# Patient Record
Sex: FEMALE | Race: WHITE | NOT HISPANIC OR LATINO | Employment: FULL TIME | URBAN - METROPOLITAN AREA
[De-identification: names, ages, dates, MRNs, and addresses within clinical notes are randomized per-mention and may not be internally consistent; named-entity substitution may affect disease eponyms.]

---

## 2017-04-10 ENCOUNTER — OFFICE VISIT - HEALTHEAST (OUTPATIENT)
Dept: FAMILY MEDICINE | Facility: CLINIC | Age: 27
End: 2017-04-10

## 2017-04-10 DIAGNOSIS — B86 SCABIES: ICD-10-CM

## 2017-07-03 ENCOUNTER — AMBULATORY - HEALTHEAST (OUTPATIENT)
Dept: ENDOCRINOLOGY | Facility: CLINIC | Age: 27
End: 2017-07-03

## 2017-07-03 DIAGNOSIS — Z00.00 PERIODIC HEALTH ASSESSMENT, GENERAL SCREENING, ADULT: ICD-10-CM

## 2017-07-03 DIAGNOSIS — E03.9 HYPOTHYROIDISM: ICD-10-CM

## 2017-07-05 ENCOUNTER — AMBULATORY - HEALTHEAST (OUTPATIENT)
Dept: ENDOCRINOLOGY | Facility: CLINIC | Age: 27
End: 2017-07-05

## 2017-07-05 ENCOUNTER — AMBULATORY - HEALTHEAST (OUTPATIENT)
Dept: LAB | Facility: CLINIC | Age: 27
End: 2017-07-05

## 2017-07-05 ENCOUNTER — COMMUNICATION - HEALTHEAST (OUTPATIENT)
Dept: LAB | Facility: CLINIC | Age: 27
End: 2017-07-05

## 2017-07-05 DIAGNOSIS — Z00.00 PERIODIC HEALTH ASSESSMENT, GENERAL SCREENING, ADULT: ICD-10-CM

## 2017-07-05 DIAGNOSIS — E03.9 HYPOTHYROIDISM: ICD-10-CM

## 2017-07-05 LAB
CHOLEST SERPL-MCNC: 167 MG/DL
FASTING STATUS PATIENT QL REPORTED: YES
HDLC SERPL-MCNC: 60 MG/DL
LDLC SERPL CALC-MCNC: 93 MG/DL
TRIGL SERPL-MCNC: 68 MG/DL

## 2017-07-13 ENCOUNTER — AMBULATORY - HEALTHEAST (OUTPATIENT)
Dept: ENDOCRINOLOGY | Facility: CLINIC | Age: 27
End: 2017-07-13

## 2017-07-13 ENCOUNTER — COMMUNICATION - HEALTHEAST (OUTPATIENT)
Dept: ENDOCRINOLOGY | Facility: CLINIC | Age: 27
End: 2017-07-13

## 2017-07-13 DIAGNOSIS — M54.9 BACK PAIN: ICD-10-CM

## 2017-07-19 ENCOUNTER — COMMUNICATION - HEALTHEAST (OUTPATIENT)
Dept: PHYSICAL MEDICINE AND REHAB | Facility: CLINIC | Age: 27
End: 2017-07-19

## 2017-07-19 ENCOUNTER — HOSPITAL ENCOUNTER (OUTPATIENT)
Dept: PHYSICAL MEDICINE AND REHAB | Facility: CLINIC | Age: 27
Discharge: HOME | End: 2017-07-19
Attending: PHYSICIAN ASSISTANT

## 2017-07-19 DIAGNOSIS — M79.18 MYOFASCIAL PAIN: ICD-10-CM

## 2017-07-19 DIAGNOSIS — G47.9 SLEEP DISTURBANCE: ICD-10-CM

## 2017-07-19 DIAGNOSIS — M54.50 LUMBALGIA: ICD-10-CM

## 2017-07-19 DIAGNOSIS — M53.3 SACROILIAC DYSFUNCTION: ICD-10-CM

## 2017-07-19 DIAGNOSIS — M54.16 LUMBAR RADICULITIS: ICD-10-CM

## 2017-07-19 ASSESSMENT — MIFFLIN-ST. JEOR: SCORE: 1361.57

## 2017-07-26 ENCOUNTER — HOSPITAL ENCOUNTER (OUTPATIENT)
Dept: MRI IMAGING | Facility: HOSPITAL | Age: 27
Discharge: HOME | End: 2017-07-26
Attending: PHYSICIAN ASSISTANT

## 2017-07-26 DIAGNOSIS — G47.9 SLEEP DISTURBANCE: ICD-10-CM

## 2017-07-26 DIAGNOSIS — M79.18 MYOFASCIAL PAIN: ICD-10-CM

## 2017-07-26 DIAGNOSIS — M53.3 SACROILIAC DYSFUNCTION: ICD-10-CM

## 2017-07-26 DIAGNOSIS — M54.16 LUMBAR RADICULITIS: ICD-10-CM

## 2017-07-26 DIAGNOSIS — M54.50 LUMBALGIA: ICD-10-CM

## 2017-07-28 ENCOUNTER — COMMUNICATION - HEALTHEAST (OUTPATIENT)
Dept: PHYSICAL MEDICINE AND REHAB | Facility: CLINIC | Age: 27
End: 2017-07-28

## 2017-07-28 ENCOUNTER — HOSPITAL ENCOUNTER (OUTPATIENT)
Dept: RADIOLOGY | Facility: HOSPITAL | Age: 27
Discharge: HOME | End: 2017-07-28
Attending: PHYSICIAN ASSISTANT

## 2017-07-28 ENCOUNTER — HOSPITAL ENCOUNTER (OUTPATIENT)
Dept: PHYSICAL MEDICINE AND REHAB | Facility: CLINIC | Age: 27
Discharge: HOME | End: 2017-07-28
Attending: PHYSICIAN ASSISTANT

## 2017-07-28 DIAGNOSIS — M79.18 MYOFASCIAL PAIN: ICD-10-CM

## 2017-07-28 DIAGNOSIS — M54.50 LUMBALGIA: ICD-10-CM

## 2017-07-28 DIAGNOSIS — G47.9 SLEEP DISTURBANCE: ICD-10-CM

## 2017-07-28 DIAGNOSIS — E03.9 HYPOTHYROIDISM: ICD-10-CM

## 2017-07-28 DIAGNOSIS — M54.16 LUMBAR RADICULITIS: ICD-10-CM

## 2017-07-28 ASSESSMENT — MIFFLIN-ST. JEOR: SCORE: 1364.74

## 2017-08-07 ENCOUNTER — OFFICE VISIT - HEALTHEAST (OUTPATIENT)
Dept: PHYSICAL THERAPY | Facility: REHABILITATION | Age: 27
End: 2017-08-07

## 2017-08-07 DIAGNOSIS — M62.81 GENERALIZED MUSCLE WEAKNESS: ICD-10-CM

## 2017-08-07 DIAGNOSIS — M54.50 ACUTE RIGHT-SIDED LOW BACK PAIN WITHOUT SCIATICA: ICD-10-CM

## 2017-08-14 ENCOUNTER — OFFICE VISIT - HEALTHEAST (OUTPATIENT)
Dept: PHYSICAL THERAPY | Facility: REHABILITATION | Age: 27
End: 2017-08-14

## 2017-08-14 DIAGNOSIS — M62.81 GENERALIZED MUSCLE WEAKNESS: ICD-10-CM

## 2017-08-14 DIAGNOSIS — M54.50 ACUTE RIGHT-SIDED LOW BACK PAIN WITHOUT SCIATICA: ICD-10-CM

## 2017-08-21 ENCOUNTER — OFFICE VISIT - HEALTHEAST (OUTPATIENT)
Dept: PHYSICAL THERAPY | Facility: REHABILITATION | Age: 27
End: 2017-08-21

## 2017-08-21 DIAGNOSIS — M54.50 ACUTE RIGHT-SIDED LOW BACK PAIN WITHOUT SCIATICA: ICD-10-CM

## 2017-08-21 DIAGNOSIS — M62.81 GENERALIZED MUSCLE WEAKNESS: ICD-10-CM

## 2017-08-25 ENCOUNTER — HOSPITAL ENCOUNTER (OUTPATIENT)
Dept: PHYSICAL MEDICINE AND REHAB | Facility: CLINIC | Age: 27
Discharge: HOME | End: 2017-08-25
Attending: PHYSICIAN ASSISTANT

## 2017-08-25 DIAGNOSIS — M53.3 SACROILIAC DYSFUNCTION: ICD-10-CM

## 2017-08-25 DIAGNOSIS — M79.18 MYOFASCIAL PAIN: ICD-10-CM

## 2017-08-25 DIAGNOSIS — G47.9 SLEEP DISTURBANCE: ICD-10-CM

## 2017-08-25 DIAGNOSIS — M54.16 LUMBAR RADICULITIS: ICD-10-CM

## 2017-08-25 DIAGNOSIS — M54.50 LUMBALGIA: ICD-10-CM

## 2017-08-25 ASSESSMENT — MIFFLIN-ST. JEOR: SCORE: 1361.57

## 2017-08-28 ENCOUNTER — OFFICE VISIT - HEALTHEAST (OUTPATIENT)
Dept: PHYSICAL THERAPY | Facility: REHABILITATION | Age: 27
End: 2017-08-28

## 2017-08-28 DIAGNOSIS — M62.81 GENERALIZED MUSCLE WEAKNESS: ICD-10-CM

## 2017-08-28 DIAGNOSIS — M54.50 ACUTE RIGHT-SIDED LOW BACK PAIN WITHOUT SCIATICA: ICD-10-CM

## 2017-09-06 ENCOUNTER — OFFICE VISIT - HEALTHEAST (OUTPATIENT)
Dept: PHYSICAL THERAPY | Facility: REHABILITATION | Age: 27
End: 2017-09-06

## 2017-09-06 DIAGNOSIS — M62.81 GENERALIZED MUSCLE WEAKNESS: ICD-10-CM

## 2017-09-06 DIAGNOSIS — M54.50 ACUTE RIGHT-SIDED LOW BACK PAIN WITHOUT SCIATICA: ICD-10-CM

## 2017-09-11 ENCOUNTER — OFFICE VISIT - HEALTHEAST (OUTPATIENT)
Dept: PHYSICAL THERAPY | Facility: REHABILITATION | Age: 27
End: 2017-09-11

## 2017-09-11 DIAGNOSIS — M62.81 GENERALIZED MUSCLE WEAKNESS: ICD-10-CM

## 2017-09-11 DIAGNOSIS — M54.50 ACUTE RIGHT-SIDED LOW BACK PAIN WITHOUT SCIATICA: ICD-10-CM

## 2017-09-25 ENCOUNTER — OFFICE VISIT - HEALTHEAST (OUTPATIENT)
Dept: PHYSICAL THERAPY | Facility: REHABILITATION | Age: 27
End: 2017-09-25

## 2017-09-25 DIAGNOSIS — M54.50 ACUTE RIGHT-SIDED LOW BACK PAIN WITHOUT SCIATICA: ICD-10-CM

## 2017-09-25 DIAGNOSIS — M62.81 GENERALIZED MUSCLE WEAKNESS: ICD-10-CM

## 2017-10-03 ENCOUNTER — COMMUNICATION - HEALTHEAST (OUTPATIENT)
Dept: ENDOCRINOLOGY | Facility: CLINIC | Age: 27
End: 2017-10-03

## 2017-10-03 DIAGNOSIS — E03.9 HYPOTHYROIDISM, UNSPECIFIED TYPE: ICD-10-CM

## 2017-10-30 ENCOUNTER — COMMUNICATION - HEALTHEAST (OUTPATIENT)
Dept: ENDOCRINOLOGY | Facility: CLINIC | Age: 27
End: 2017-10-30

## 2017-10-31 ENCOUNTER — COMMUNICATION - HEALTHEAST (OUTPATIENT)
Dept: ENDOCRINOLOGY | Facility: CLINIC | Age: 27
End: 2017-10-31

## 2017-10-31 DIAGNOSIS — E03.9 HYPOTHYROIDISM, UNSPECIFIED TYPE: ICD-10-CM

## 2018-01-26 ENCOUNTER — COMMUNICATION - HEALTHEAST (OUTPATIENT)
Dept: ENDOCRINOLOGY | Facility: CLINIC | Age: 28
End: 2018-01-26

## 2018-01-26 ENCOUNTER — AMBULATORY - HEALTHEAST (OUTPATIENT)
Dept: ENDOCRINOLOGY | Facility: CLINIC | Age: 28
End: 2018-01-26

## 2018-01-26 DIAGNOSIS — E03.9 HYPOTHYROIDISM: ICD-10-CM

## 2018-01-26 DIAGNOSIS — E03.9 HYPOTHYROIDISM, UNSPECIFIED TYPE: ICD-10-CM

## 2018-01-29 ENCOUNTER — AMBULATORY - HEALTHEAST (OUTPATIENT)
Dept: LAB | Facility: CLINIC | Age: 28
End: 2018-01-29

## 2018-01-29 DIAGNOSIS — E03.9 HYPOTHYROIDISM: ICD-10-CM

## 2018-01-29 LAB
T4 FREE SERPL-MCNC: 1.3 NG/DL (ref 0.7–1.8)
TSH SERPL DL<=0.005 MIU/L-ACNC: 0.36 UIU/ML (ref 0.3–5)

## 2018-02-22 ENCOUNTER — OFFICE VISIT - HEALTHEAST (OUTPATIENT)
Dept: ENDOCRINOLOGY | Facility: CLINIC | Age: 28
End: 2018-02-22

## 2018-02-22 DIAGNOSIS — E03.9 HYPOTHYROIDISM, UNSPECIFIED TYPE: ICD-10-CM

## 2018-02-22 ASSESSMENT — MIFFLIN-ST. JEOR: SCORE: 1366.56

## 2018-12-28 ENCOUNTER — AMBULATORY - HEALTHEAST (OUTPATIENT)
Dept: ENDOCRINOLOGY | Facility: CLINIC | Age: 28
End: 2018-12-28

## 2018-12-28 DIAGNOSIS — E03.9 HYPOTHYROIDISM: ICD-10-CM

## 2019-02-19 ENCOUNTER — AMBULATORY - HEALTHEAST (OUTPATIENT)
Dept: LAB | Facility: CLINIC | Age: 29
End: 2019-02-19

## 2019-02-19 DIAGNOSIS — E03.9 HYPOTHYROIDISM: ICD-10-CM

## 2019-02-19 LAB
T4 FREE SERPL-MCNC: 1.2 NG/DL (ref 0.7–1.8)
TSH SERPL DL<=0.005 MIU/L-ACNC: 0.79 UIU/ML (ref 0.3–5)

## 2019-02-26 ENCOUNTER — OFFICE VISIT - HEALTHEAST (OUTPATIENT)
Dept: ENDOCRINOLOGY | Facility: CLINIC | Age: 29
End: 2019-02-26

## 2019-02-26 DIAGNOSIS — E03.9 HYPOTHYROIDISM, UNSPECIFIED TYPE: ICD-10-CM

## 2019-02-26 ASSESSMENT — MIFFLIN-ST. JEOR: SCORE: 1320.29

## 2019-07-05 ENCOUNTER — OFFICE VISIT - HEALTHEAST (OUTPATIENT)
Dept: FAMILY MEDICINE | Facility: CLINIC | Age: 29
End: 2019-07-05

## 2019-07-05 DIAGNOSIS — N30.01 ACUTE CYSTITIS WITH HEMATURIA: ICD-10-CM

## 2019-07-05 LAB
ALBUMIN UR-MCNC: NEGATIVE MG/DL
APPEARANCE UR: CLEAR
BACTERIA #/AREA URNS HPF: ABNORMAL HPF
BILIRUB UR QL STRIP: NEGATIVE
COLOR UR AUTO: YELLOW
GLUCOSE UR STRIP-MCNC: NEGATIVE MG/DL
HCG UR QL: NEGATIVE
HGB UR QL STRIP: ABNORMAL
KETONES UR STRIP-MCNC: NEGATIVE MG/DL
LEUKOCYTE ESTERASE UR QL STRIP: ABNORMAL
NITRATE UR QL: NEGATIVE
PH UR STRIP: 7 [PH] (ref 5–8)
RBC #/AREA URNS AUTO: ABNORMAL HPF
SP GR UR STRIP: 1.01 (ref 1–1.03)
SQUAMOUS #/AREA URNS AUTO: ABNORMAL LPF
UROBILINOGEN UR STRIP-ACNC: ABNORMAL
WBC #/AREA URNS AUTO: ABNORMAL HPF

## 2019-07-07 LAB — BACTERIA SPEC CULT: ABNORMAL

## 2020-02-18 ENCOUNTER — AMBULATORY - HEALTHEAST (OUTPATIENT)
Dept: LAB | Facility: CLINIC | Age: 30
End: 2020-02-18

## 2020-02-18 DIAGNOSIS — E03.9 HYPOTHYROIDISM: ICD-10-CM

## 2020-02-18 LAB
T4 FREE SERPL-MCNC: 1.2 NG/DL (ref 0.7–1.8)
TSH SERPL DL<=0.005 MIU/L-ACNC: 2.02 UIU/ML (ref 0.3–5)

## 2020-02-25 ENCOUNTER — OFFICE VISIT - HEALTHEAST (OUTPATIENT)
Dept: ENDOCRINOLOGY | Facility: CLINIC | Age: 30
End: 2020-02-25

## 2020-02-25 DIAGNOSIS — E03.9 HYPOTHYROIDISM, UNSPECIFIED TYPE: ICD-10-CM

## 2020-02-25 ASSESSMENT — MIFFLIN-ST. JEOR: SCORE: 1340.25

## 2021-01-29 ENCOUNTER — AMBULATORY - HEALTHEAST (OUTPATIENT)
Dept: ENDOCRINOLOGY | Facility: CLINIC | Age: 31
End: 2021-01-29

## 2021-01-29 DIAGNOSIS — E03.9 HYPOTHYROIDISM, UNSPECIFIED TYPE: ICD-10-CM

## 2021-01-31 ENCOUNTER — COMMUNICATION - HEALTHEAST (OUTPATIENT)
Dept: ENDOCRINOLOGY | Facility: CLINIC | Age: 31
End: 2021-01-31

## 2021-01-31 DIAGNOSIS — E03.9 HYPOTHYROIDISM, UNSPECIFIED TYPE: ICD-10-CM

## 2021-02-17 ENCOUNTER — AMBULATORY - HEALTHEAST (OUTPATIENT)
Dept: LAB | Facility: CLINIC | Age: 31
End: 2021-02-17

## 2021-02-17 ENCOUNTER — COMMUNICATION - HEALTHEAST (OUTPATIENT)
Dept: ENDOCRINOLOGY | Facility: CLINIC | Age: 31
End: 2021-02-17

## 2021-02-17 DIAGNOSIS — E03.9 HYPOTHYROIDISM, UNSPECIFIED TYPE: ICD-10-CM

## 2021-02-17 LAB
ANION GAP SERPL CALCULATED.3IONS-SCNC: 7 MMOL/L (ref 5–18)
BUN SERPL-MCNC: 9 MG/DL (ref 8–22)
CALCIUM SERPL-MCNC: 8.7 MG/DL (ref 8.5–10.5)
CHLORIDE BLD-SCNC: 105 MMOL/L (ref 98–107)
CO2 SERPL-SCNC: 26 MMOL/L (ref 22–31)
CREAT SERPL-MCNC: 0.73 MG/DL (ref 0.6–1.1)
GFR SERPL CREATININE-BSD FRML MDRD: >60 ML/MIN/1.73M2
GLUCOSE BLD-MCNC: 82 MG/DL (ref 70–125)
POTASSIUM BLD-SCNC: 4.1 MMOL/L (ref 3.5–5)
SODIUM SERPL-SCNC: 138 MMOL/L (ref 136–145)
T4 FREE SERPL-MCNC: 1.3 NG/DL (ref 0.7–1.8)
TSH SERPL DL<=0.005 MIU/L-ACNC: 0.38 UIU/ML (ref 0.3–5)

## 2021-02-22 ENCOUNTER — COMMUNICATION - HEALTHEAST (OUTPATIENT)
Dept: ENDOCRINOLOGY | Facility: CLINIC | Age: 31
End: 2021-02-22

## 2021-02-24 ENCOUNTER — OFFICE VISIT - HEALTHEAST (OUTPATIENT)
Dept: ENDOCRINOLOGY | Facility: CLINIC | Age: 31
End: 2021-02-24

## 2021-02-24 DIAGNOSIS — E03.9 HYPOTHYROIDISM, UNSPECIFIED TYPE: ICD-10-CM

## 2021-02-24 DIAGNOSIS — E03.9 HYPOTHYROIDISM: ICD-10-CM

## 2021-02-24 RX ORDER — LEVOTHYROXINE SODIUM 137 MCG
TABLET ORAL
Qty: 90 TABLET | Refills: 3 | Status: SHIPPED | OUTPATIENT
Start: 2021-02-24 | End: 2022-01-12

## 2021-05-19 ENCOUNTER — COMMUNICATION - HEALTHEAST (OUTPATIENT)
Dept: ENDOCRINOLOGY | Facility: CLINIC | Age: 31
End: 2021-05-19

## 2021-05-19 DIAGNOSIS — E03.9 HYPOTHYROIDISM, UNSPECIFIED TYPE: ICD-10-CM

## 2021-05-19 RX ORDER — BUPROPION HYDROCHLORIDE 150 MG/1
TABLET ORAL
Qty: 90 TABLET | Refills: 1 | Status: SHIPPED | OUTPATIENT
Start: 2021-05-19 | End: 2021-11-11

## 2021-05-30 VITALS — BODY MASS INDEX: 20.12 KG/M2 | WEIGHT: 130.4 LBS

## 2021-05-30 NOTE — PROGRESS NOTES
ASSESSMENT/PLAN:   1. Acute cystitis with hematuria  Urinalysis-UC if Indicated    Pregnancy (Beta-hCG, Qual), Urine    nitrofurantoin, macrocrystal-monohydrate, (MACROBID) 100 MG capsule    Culture, Urine       The patient's symptoms and laboratory studies suggest uncomplicated UTI.   Clinically, this is not pyelonephritis, urosepsis given no fevers. Nothing on history to suggest kidney stone, ovarian torsion, PID, appendicitis, diverticulitis, kidney injury, glomerular bleeding, or any other urgent or emergent condition.     The patient is vitally stable and appropriate for outpatient antibiotic therapy. I have prescribed nitrofurantoin for the patient. I educated the patient to drink plenty of fluids and to take a probiotic while taking antibiotics.    Urine culture is pending, patient aware that they will only be contacted should culture result necessitate a change in treatment plan.    I educated the patient on warning signs for immediate follow up including fevers/chills, nausea, vomiting, hematuria, abdominal pain, altered mental status. I educated the patient to otherwise follow up with primary care doctor as needed or if symptoms do not improve. Please view below patient instructions for patient education and return precautions as were discussed during visit.  Patient understood and agreed. Patient was stable for discharge.      Patient Instructions:  Patient Instructions   Your urine test shows evidence of a urinary tract infection.    We will treat you with an antibiotic, macrobid.  I sent this to your pharmacy. Please take as directed for 5 days. Please take a probiotic or eat a daily Greek yogurt while you are on the antibiotic.    A urine culture is still in process, it usually takes about 2 days and identifies the bacteria causing the infection.  It also tests antibiotics to make sure what we use will be effective for your infection.  We will only call you if the results of this test show a need to  change your treatment plan.    If developing high fevers, vomiting, abdominal pain, or any other new, concerning symptoms, come back immediately. If no improvement in symptoms by the end of your antibiotic treatment, follow up with your primary care doctor.    Otherwise, simply follow up as needed.        Urinary Tract Infections in Women  Urinary tract infections (UTIs) are most often caused by bacteria (germs). These bacteria enter the urinary tract. The bacteria may come from outside the body. Or they may travel from the skin outside the rectum or vagina into the urethra. Female anatomy makes it easier for bacteria from the bowel to enter a woman s urinary tract, which is the most common source of UTI. This means women develop UTIs more often than men. Pain in or around the urinary tract is a common UTI symptom. But the only way to know for sure if you have a UTI for the health care provider to test your urine. The two tests that may be done are the urinalysis and urine culture.  Types of UTIs    Cystitis: A bladder infection (cystitis) is the most common UTI in women. You may have urgent or frequent urination. You may also have pain, burning when you urinate, and bloody urine.    Urethritis: This is an inflamed urethra, which is the tube that carries urine from the bladder to outside the body. You may have lower stomach or back pain. You may also have urgent or frequent urination.    Pyelonephritis: This is a kidney infection. If not treated, it can be serious and damage your kidneys. In severe cases, you may be hospitalized. You may have a fever and lower back pain.  Medications to treat a UTI  Most UTIs are treated with antibiotics. These kill the bacteria. The length of time you need to take them depends on the type of infection. It may be as short as 3 days. If you have repeated UTIs, a low-dose antibiotic may be needed for several months. Take antibiotics exactly as directed. Don t stop taking them until  all of the medication is gone. If you stop taking the antibiotic too soon, the infection may not go away, and you may develop a resistance to the antibiotic. This can make it much harder to treat.  Lifestyle changes to treat and prevent UTIs  The lifestyle changes below will help get rid of your UTI. They may also help prevent future UTIs.    Drink plenty of fluids. This includes water, juice, or other caffeine-free drinks. Fluids help flush bacteria out of your body.    Empty your bladder. Always empty your bladder when you feel the urge to urinate. And always urinate before going to sleep. Urine that stays in your bladder can lead to infection. Try to urinate before and after sex as well.    Practice good personal hygiene. Wipe yourself from front to back after using the toilet. This helps keep bacteria from getting into the urethra.    Use condoms during sex. These help prevent UTIs caused by sexually transmitted bacteria. Also, avoid using spermicides during sex. These can increase the risk of UTIs. Choose other forms of birth control instead. For women who tend to get UTIs after sex, a low-dose of a preventive antibiotic may be used. Be sure to discuss this option with your health care provider.    Follow up with your health care provider as directed. He or she may test to make sure the infection has cleared. If necessary, additional treatment may be started.    7842-0434 [employer]. [address] [place] [id number]. All rights reserved. This information is not intended as a substitute for professional medical care. Always follow your healthcare professional's instructions.          SUBJECTIVE:   Hillary Singh is a 28 y.o. female who presents today for evaluation of increase in urinary frequency, dysuria onset yesterday.  Has acute onset painful urination at 2am, which is when hematuria started, then pain resolved however hematuria continues.  No abdominal pain, or increase back  pain.  Patient does have back pain at baseline and this is unchanged.  No nausea or vomiting.  Denies measured fevers, but notes chills, body aches and sweats.  No history of nephrolithiasis or frequent UTI. No vaginal discharge, itching or odor.    Past Medical History:  Patient Active Problem List   Diagnosis     Hypothyroidism       Surgical History:    Reviewed; Non-contributory    Family History:  No family history on file.    Reviewed; Non-contributory      Social History:    Social History     Tobacco Use   Smoking Status Never Smoker   Smokeless Tobacco Never Used     Smoking:  Alcohol use:  Other drug use:  Occupation:      Smoke exposure:  :  Living situation:    Current Medications:  Current Outpatient Medications on File Prior to Visit   Medication Sig Dispense Refill     acetaminophen (TYLENOL) 325 MG tablet Take 650 mg by mouth every 6 (six) hours as needed for pain.       buPROPion (WELLBUTRIN XL) 150 MG 24 hr tablet Take 150 mg by mouth daily.  11     ibuprofen (ADVIL,MOTRIN) 200 MG tablet Take 200 mg by mouth every 6 (six) hours as needed for pain.       norethindrone (MICRONOR) 0.35 mg tablet Take 1 tablet by mouth daily.       SYNTHROID 137 mcg tablet TAKE ONE TABLET BY MOUTH EVERY DAY AT 6 AM 90 tablet 3     No current facility-administered medications on file prior to visit.        Allergies:   No Known Allergies    I personally reviewed patient's past medical, surgical, social, family history and allergies.    ROS:  Review of Systems  An 8point review of systems was conducted and otherwise negative unless noted in HPI.          OBJECTIVE:   /72 (Patient Site: Right Arm, Patient Position: Sitting, Cuff Size: Adult Regular)   Pulse 90   Temp 97.8  F (36.6  C) (Oral)   Resp 16   Wt 123 lb 3 oz (55.9 kg)   SpO2 100%   BMI 19.01 kg/m        General Appearance:  Alert, well-appearing female in NAD. Afebrile.    Integument: Warm, dry  HEENT: Moist mucus membranes.  Respiratory: No  distress. Lungs clear to ausculation bilaterally. No crackles, wheezes, rhonchi or stridor.  Cardiovascular: Regular rate and rhythm, no murmur, rub or gallop. No obvious chest wall deformities. Peripheral pulses 2+ bilaterally. No peripheral edema.  GI: Soft, nontender, normal bowel sounds. No masses, organomegaly, rigidity, or guarding. No CVAT.  : deferred  Neurologic: Alert and orientated appropriately. No focal deficits. Follows commands.          Radiology:  I personally ordered and viewed this study. I agree with below radiology findings.    none    Laboratory Studies:  I personally ordered and interpreted these studies.    Results for orders placed or performed in visit on 07/05/19   Urinalysis-UC if Indicated   Result Value Ref Range    Color, UA Yellow Colorless, Yellow, Straw, Light Yellow    Clarity, UA Clear Clear    Glucose, UA Negative Negative    Bilirubin, UA Negative Negative    Ketones, UA Negative Negative    Specific Gravity, UA 1.010 1.005 - 1.030    Blood, UA Large (!) Negative    pH, UA 7.0 5.0 - 8.0    Protein, UA Negative Negative mg/dL    Urobilinogen, UA 0.2 E.U./dL 0.2 E.U./dL, 1.0 E.U./dL    Nitrite, UA Negative Negative    Leukocytes, UA Large (!) Negative    Bacteria, UA Few (!) None Seen hpf    RBC, UA  (!) None Seen, 0-2 hpf    WBC, UA  (!) None Seen, 0-5 hpf    Squam Epithel, UA 0-5 None Seen, 0-5 lpf   Pregnancy (Beta-hCG, Qual), Urine   Result Value Ref Range    Pregnancy Test, Urine Negative Negative       Erlinda Centeno, JCARLOS

## 2021-05-30 NOTE — PATIENT INSTRUCTIONS - HE
Your urine test shows evidence of a urinary tract infection.    We will treat you with an antibiotic, macrobid.  I sent this to your pharmacy. Please take as directed for 5 days. Please take a probiotic or eat a daily Greek yogurt while you are on the antibiotic.    A urine culture is still in process, it usually takes about 2 days and identifies the bacteria causing the infection.  It also tests antibiotics to make sure what we use will be effective for your infection.  We will only call you if the results of this test show a need to change your treatment plan.    If developing high fevers, vomiting, abdominal pain, or any other new, concerning symptoms, come back immediately. If no improvement in symptoms by the end of your antibiotic treatment, follow up with your primary care doctor.    Otherwise, simply follow up as needed.        Urinary Tract Infections in Women  Urinary tract infections (UTIs) are most often caused by bacteria (germs). These bacteria enter the urinary tract. The bacteria may come from outside the body. Or they may travel from the skin outside the rectum or vagina into the urethra. Female anatomy makes it easier for bacteria from the bowel to enter a woman s urinary tract, which is the most common source of UTI. This means women develop UTIs more often than men. Pain in or around the urinary tract is a common UTI symptom. But the only way to know for sure if you have a UTI for the health care provider to test your urine. The two tests that may be done are the urinalysis and urine culture.  Types of UTIs    Cystitis: A bladder infection (cystitis) is the most common UTI in women. You may have urgent or frequent urination. You may also have pain, burning when you urinate, and bloody urine.    Urethritis: This is an inflamed urethra, which is the tube that carries urine from the bladder to outside the body. You may have lower stomach or back pain. You may also have urgent or frequent  urination.    Pyelonephritis: This is a kidney infection. If not treated, it can be serious and damage your kidneys. In severe cases, you may be hospitalized. You may have a fever and lower back pain.  Medications to treat a UTI  Most UTIs are treated with antibiotics. These kill the bacteria. The length of time you need to take them depends on the type of infection. It may be as short as 3 days. If you have repeated UTIs, a low-dose antibiotic may be needed for several months. Take antibiotics exactly as directed. Don t stop taking them until all of the medication is gone. If you stop taking the antibiotic too soon, the infection may not go away, and you may develop a resistance to the antibiotic. This can make it much harder to treat.  Lifestyle changes to treat and prevent UTIs  The lifestyle changes below will help get rid of your UTI. They may also help prevent future UTIs.    Drink plenty of fluids. This includes water, juice, or other caffeine-free drinks. Fluids help flush bacteria out of your body.    Empty your bladder. Always empty your bladder when you feel the urge to urinate. And always urinate before going to sleep. Urine that stays in your bladder can lead to infection. Try to urinate before and after sex as well.    Practice good personal hygiene. Wipe yourself from front to back after using the toilet. This helps keep bacteria from getting into the urethra.    Use condoms during sex. These help prevent UTIs caused by sexually transmitted bacteria. Also, avoid using spermicides during sex. These can increase the risk of UTIs. Choose other forms of birth control instead. For women who tend to get UTIs after sex, a low-dose of a preventive antibiotic may be used. Be sure to discuss this option with your health care provider.    Follow up with your health care provider as directed. He or she may test to make sure the infection has cleared. If necessary, additional treatment may be started.    [phone]  [employer]. [address] [place] [address], [place] [address]. All rights reserved. This information is not intended as a substitute for professional medical care. Always follow your healthcare professional's instructions.

## 2021-05-31 VITALS — WEIGHT: 134.3 LBS | HEIGHT: 68 IN | BODY MASS INDEX: 20.35 KG/M2

## 2021-05-31 VITALS — WEIGHT: 133.6 LBS | BODY MASS INDEX: 20.25 KG/M2 | HEIGHT: 68 IN

## 2021-05-31 VITALS — BODY MASS INDEX: 20.25 KG/M2 | HEIGHT: 68 IN | WEIGHT: 133.6 LBS

## 2021-05-31 VITALS — WEIGHT: 134.7 LBS | BODY MASS INDEX: 20.41 KG/M2 | HEIGHT: 68 IN

## 2021-06-02 VITALS — WEIGHT: 124.5 LBS | BODY MASS INDEX: 18.87 KG/M2 | HEIGHT: 68 IN

## 2021-06-03 VITALS — WEIGHT: 123.19 LBS | BODY MASS INDEX: 19.01 KG/M2

## 2021-06-04 VITALS
BODY MASS INDEX: 19.54 KG/M2 | HEART RATE: 66 BPM | DIASTOLIC BLOOD PRESSURE: 60 MMHG | WEIGHT: 128.9 LBS | HEIGHT: 68 IN | SYSTOLIC BLOOD PRESSURE: 102 MMHG

## 2021-06-06 NOTE — PROGRESS NOTES
"[facility]  ENDOCRINOLOGY    Thyroid Note  2/26/2020    Hillary Singh, 1990, [id number]          Reason for visit      1. Hypothyroidism, unspecified type        HPI     Hillary Singh is a very pleasant 29 y.o. old female who presents for follow up.  SUMMARY:    Hillary returns today in f/u for hypothyroidism.  She reports that she is feeling well.  She is having no problems referable to her neck at present.  She continues to take Synthroid 137 mcg daily on an empty stomach. Current TSH is 2.02 and Free T 4 is 1.2.     Past Medical History     Patient Active Problem List   Diagnosis     Hypothyroidism       Family History       family history is not on file.    Social History      reports that she has never smoked. She has never used smokeless tobacco.      Review of Systems     Patient denies fatigue, weight changes, heat/cold intolerance, bowel/skin changes or CVS symptoms.   Remainder per HPI and per attached intake form.      Vital Signs     /60 (Patient Site: Right Arm, Patient Position: Sitting, Cuff Size: Adult Regular)   Pulse 66   Ht 5' 7.5\" (1.715 m)   Wt 128 lb 14.4 oz (58.5 kg)   BMI 19.89 kg/m    Wt Readings from Last 3 Encounters:   02/25/20 128 lb 14.4 oz (58.5 kg)   07/05/19 123 lb 3 oz (55.9 kg)   02/26/19 124 lb 8 oz (56.5 kg)       Physical Exam     General:  Normal, NIRD,appears euthyroid  Eyes:  Pupils equal, round and reactive to light; no proptosis, lid lag or  periorbital edema.  Thyroid:  Thyroid is normal.  No tenderness or bruit  Neck: No lymph nodes  Musculoskeletal:  Muscle strength grossly normal without evidence of wasting.  Heart:  Regular rate and rhythm without murmur.  Lungs:  Clear to auscultation.  Abdomen: Soft, non-tender, no masses or organomegaly  Neuro: Patella Reflexes were normal.No tremors  Skin:  No acanthosis nigricans or vitiligo          Assessment     1. Hypothyroidism, unspecified type            Plan     Pt is bio-chemically and physically " euthyroid. She will continue on the Synthroid 137 mcg daily.  F/u with me in 1 year, sooner with problems or concerns. Time spent with pt today: 25 min with >50% spent in counseling and coordination of care.          Korina Heaton   Endocrinology  2/26/2020  1:09 PM        Lab Results     TSH   Date Value Ref Range Status   02/18/2020 2.02 0.30 - 5.00 uIU/mL Final     No results found for: THYROIDAB    No results found for: E6ZKJYW    Imaging Results   Last thyroid ultrasound:  No results found for this or any previous visit.    Last thyroid nuclear scan:  No results found for this or any previous visit.    Current Medications     Outpatient Medications Prior to Visit   Medication Sig Dispense Refill     buPROPion (WELLBUTRIN XL) 150 MG 24 hr tablet Take 150 mg by mouth daily.  11     norethindrone (MICRONOR) 0.35 mg tablet Take 1 tablet by mouth daily.       SYNTHROID 137 mcg tablet TAKE ONE TABLET BY MOUTH EVERY DAY AT 6 AM 90 tablet 3     ibuprofen (ADVIL,MOTRIN) 200 MG tablet Take 200 mg by mouth every 6 (six) hours as needed for pain.       acetaminophen (TYLENOL) 325 MG tablet Take 650 mg by mouth every 6 (six) hours as needed for pain.       No facility-administered medications prior to visit.

## 2021-06-10 NOTE — PROGRESS NOTES
A/P:  1. Scabies  permethrin (ELIMITE) 5 % cream     Most consistent with scabies.     permethrin prescribed  Take an antihistamine such as claritin, zyrtec or allegra to lessen symptoms. Benadryl at bedtime.   Wash all clothing worn in past week, sheet, linens, ect in hot water.   Make sure your boyfriend also has treatment before being in close contact  Return to clinic if symptoms are not improving as expected or if worsening in any way.        SUBJECTIVE:  Hillary Singh is a 26 y.o. female presents with 1 weeks complaint of rash. Rash was first noticed bilateral lower wrists and elbows.  Now is on her left waistband. Rash is pruritic, worse at night. Rash is not weeping or discharging. Denies fever, chills, recent illness. Boyfriend with similiar rash, his in web spaces of his fingers. She has not had exposure to new soaps, cosmetics, detergents, fabric softners, animals, plants or anything else that could have caused this rash.  She does not hx of dry skin and/or prior similar problems. Has tried OTC hydrocortisone cream without relief.     No past medical history on file.    Current Medications:  Current Outpatient Prescriptions on File Prior to Visit   Medication Sig Dispense Refill     norethindrone (MICRONOR) 0.35 mg tablet Take 1 tablet by mouth daily.       SYNTHROID 137 mcg tablet TAKE ONE TABLET BY MOUTH EVERY DAY AT 6 AM 90 tablet 3     No current facility-administered medications on file prior to visit.         Allergies:  No Known Allergies      OBJECTIVE:    /70 (Patient Site: Right Arm, Patient Position: Sitting)  Pulse 80  Temp 98.4  F (36.9  C) (Oral)   Resp 16  Wt 130 lb 6.4 oz (59.1 kg)  SpO2 100%  Breastfeeding? No  BMI 20.12 kg/m2    Physical exam reveals a pleasant 26 y.o. female.   Appears healthy, alert and cooperative.  Skin: She has erythematous, scattered, 2mm papules with hemorrhagic tip on extensor elbows, flexor wrists and just starting on left waistband.  There is  no evidence of secondary infection.  No significant associated edema or induration.    Neuro: rash does not follow a dermatone.  No neuropathy.
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
## 2021-06-12 NOTE — PROGRESS NOTES
Assessment / Plan:   Diagnoses and all orders for this visit:    Lumbalgia    Lumbar radiculitis    Myofascial pain    Sleep disturbance    Sacroiliac dysfunction          Hillary Singh is a 27 y.o. y.o. female with past medical history significant for hypothyroidism who presents today for  follow-up regarding chronic bilateral low back pain,  with the right side being worse than the left side, and intermittent radicular pain to the right gluteal right hip, right lateral thigh.   Patient symptoms could be due to the mild neural foraminal narrowing at the right L4-L5. Today patient reports significant improvement in her symptoms with physical therapy.  She denies radicular pain to the right lower extremity.  She has minimal pain in the lower back.  Patient is currently not taking any pain medication.  I recommend patient to continue on physical therapy.    A shared decision making plan was used.  The patient's values and choices were respected.  The following represents what was discussed and decided upon by the physician and the patient.      1.  DIAGNOSTIC TESTS: I reviewed the lumbar MRI imaging and the report with the patient today.  He verbalizes understanding.  2.  PHYSICAL THERAPY: Patient will continue on physical therapy [facility] program.  3.  MEDICATIONS: Patient will continue on Tylenol 500 mg as needed for pain.  4.  INTERVENTIONS: No therapeutic injection at this time.  5.  PATIENT EDUCATION: I thoroughly discussed the plan with the patient today.  She verbalizes understanding and agrees with the plan.  6.  FOLLOW-UP: Patient will follow up with me in 5 weeks.  All questions were answered.      CASSIE Musa, MPA-C      Subjective:     iHllary Singh is a 27 y.o. female who presents today for follow-up regarding back pain radiating to the right hip, right lateral thigh.  Today patient returns to the clinic reporting significant improvement in her symptoms with physical therapy.  She reports 1  out of 10 intensity pain in the lower back with no radicular pain to the right lower extremity.  Patient stated that she has one single episode of radicular pain to the lower extremity for the last 4 weeks.  She started in physical therapy and had 3 sessions so far.  Patient stated that she has not been taking any pain medication.  Patient is not taking gabapentin at this time. Patient denies urinary or bowel incontinence, unintentional weight loss, saddle anesthesia, fever or chills, balance difficulties.      Review of Systems:  Mild back pain. Patient denies urinary or bowel incontinence, unintentional weight loss, saddle anesthesia, fever or chills, balance difficulties.       Objective:   CONSTITUTIONAL:  Vital signs as above.  No acute distress.  The patient is well nourished and well groomed.    PSYCHIATRIC:  The patient is awake, alert, oriented to person, place and time.  The patient is answering questions appropriately with clear speech.  Normal affect.  SKIN:  Skin over the face, posterior torso, bilateral upper and lower extremities is clean, dry, intact without rashes.  MUSCULOSKELETAL:  Gait is non-antalgic.  The patient is able to heel and toe walk without any difficulty.  Mild tenderness over the right L4-L5, L5-S1 lumbar paraspinal muscles.      The patient has 5/5 strength for the bilateral hip flexors, knee flexors/extensors, ankle dorsiflexors/plantar flexors, ankle evertors/invertors.    NEUROLOGICAL:  2/4 patellar, medial hamstring, achilles reflexes which are symmetric bilaterally.  No ankle clonus bilaterally.  Sensation to light touch is intact in the bilateral L4, L5, and S1 dermatomes.       RESULTS:      [facility]  MR LUMBAR SPINE WO CONTRAST  7/26/2017 6:02 PM       INDICATION: Lumbalgia, right radicular pain. Right SI pain.  TECHNIQUE: Routine.  CONTRAST: None.  COMPARISON: None.      FINDINGS: Nomenclature is based on 5 lumbar type vertebral bodies. Normal vertebral body  heights, alignment and marrow signal. No pars defect. The conus tip is identified at L1. Grossly normal paraspinal soft tissues. No abdominal aortic aneurysm. The   visualized portions of the bony pelvis are normal for age.      T12-L1: Normal disc height and signal. No herniation. No facet arthropathy. No spinal canal stenosis. No right neural foraminal stenosis. No left neural foraminal stenosis.      L1-L2: Normal disc height and signal. No herniation. No facet arthropathy. No spinal canal stenosis. No right neural foraminal stenosis. No left neural foraminal stenosis.      L2-L3: Normal disc height and signal. No herniation. No facet arthropathy. No spinal canal stenosis. No right neural foraminal stenosis. No left neural foraminal stenosis.      L3-L4: Mild loss of disc signal with preservation of the interspace height. There is a mild broad-based annular bulge and very mild facet arthropathy. No spinal canal stenosis. No right neural foraminal stenosis. No left neural foraminal stenosis.      L4-L5: Mild loss of T2 disc signal with preservation of the interspace height. There is a small posterior, midline disc protrusion as seen on series 6 image 16 and series 3 image 8. Mild facet arthropathy. No central spinal canal or neural foraminal   stenosis.      L5-S1: Mild loss of T2 disc signal with preservation of the interspace height. There is very mild broad-based annular bulge and mild facet arthropathy. No central spinal canal or neural foraminal stenosis.      IMPRESSION:   CONCLUSION:  1.  Mild multilevel spondylotic changes in the lower lumbar spine without resulting central spinal canal or neural foraminal stenosis.

## 2021-06-12 NOTE — PROGRESS NOTES
"[facility] Daily Progress     Patient Name: Hillary Singh  Date: 2017  Visit #: 2  PTA visit #:  1  Referral Diagnosis: Acute right - sided low back pain without sciatica  Referring provider: Rachel Mckeon PA-C  Visit Diagnosis:     ICD-10-CM    1. Acute right-sided low back pain without sciatica M54.5    2. Generalized muscle weakness M62.81          Assessment:   Pt was seen in PT today for her first follow up appointment. Pt has been compliant with her HEP. Her main concerns today is B hip soreness.  Pt demonstrates decreased b hip external rotation ROM.  Patient is benefitting from skilled physical therapy and is making steady progress toward functional goals.  Patient is appropriate to continue with skilled physical therapy intervention, as indicated by initial plan of care.    Goal Status: Ongoing  Pt. will be independent with home exercise program in : 4 weeks (to self-manage apin and improve function)  Pt will: do yardwork for 2 hours with less than 3/10 pain in 8 weeks.  Pt will: be able to walk for 60 min for return to recreational activities in 8 weeks with less than 3/10 pain.    Plan / Patient Education:     Continue with initial plan of care.  Progress with home program as tolerated.   Go over HEP and progress.    Subjective:   Pt reports her back feels good, \" my hips are sore.\" Pt states she pulled some weeds over the weekend.  Pain Ratin        Objective:     Pt was seen today from 4:30-5:00 PM.  Treatment Today     TREATMENT MINUTES COMMENTS   Evaluation     Self-care/ Home management     Manual therapy 10 MFR/STM to R lumbar paraspinals, R QL and R gluts, pt L SL   Neuromuscular Re-education     Therapeutic Activity     Therapeutic Exercises 20 See flow sheet, added to HEP: supine march with ab sets, multifidus with orange band (to center only) SL hip AB, clamshell   Gait training     Modality__________________                Total 30    Blank areas are intentional and " mean the treatment did not include these items.       Magui Lawler,WENDY  8/14/2017

## 2021-06-12 NOTE — PROGRESS NOTES
"[facility] Daily Progress     Patient Name: Hillary Singh  Date: 2017  Visit #: 4  PTA visit #: 3  Referral Diagnosis: Acute right - sided low back pain without sciatica  Referring provider: Rachel Mckeon PA-C  Visit Diagnosis:     ICD-10-CM    1. Acute right-sided low back pain without sciatica M54.5    2. Generalized muscle weakness M62.81          Assessment:   Pt is making progress towards initial goals.  Decreased subjective pain complaint.   Patient is benefitting from skilled physical therapy and is making steady progress toward functional goals.  Patient is appropriate to continue with skilled physical therapy intervention, as indicated by initial plan of care.    Goal Status: Progressing towards  Pt. will be independent with home exercise program in : 4 weeks (to self-manage apin and improve function)  Pt will: do yardwork for 2 hours with less than 3/10 pain in 8 weeks.  Pt will: be able to walk for 60 min for return to recreational activities in 8 weeks with less than 3/10 pain. Pt has been able to walk for 40 minutes without increased symptoms    Plan / Patient Education:     Continue with initial plan of care.  Progress with home program as tolerated.   Go over HEP and progress.    Subjective:   Pt reports she saw Rachel [facility] at [facility] on . Pt is to continue with PT and follow up in 5 weeks.  Pt reports her hips are stiff. She has been ill the last few days. Pt did walk and do her exercises over the weekend.  Pt reports she notices some improvement. \" 60-70%.\" Pt feels that her core strength and posture has improved.    Pain Ratin-2/10        Objective:   Lumbar ROM: All planes WNL and painfree  B hip external rotators and hip flexors are tight  Treatment Today     TREATMENT MINUTES COMMENTS   Evaluation     Self-care/ Home management     Manual therapy  MT not done today   Neuromuscular Re-education     Therapeutic Activity     Therapeutic " "Exercises 25 See flow sheet, added to HEP: supine hip flexor stretch (off side of bed) progressed to \"toe taps\" vs supine march, progressed to bridge with knee extension, added orange band to clamshells    Gait training     Modality__________________                Total 25    Blank areas are intentional and mean the treatment did not include these items.       Magui Lawler,SHUBHAMT  8/28/2017      "

## 2021-06-12 NOTE — PROGRESS NOTES
[facility] Daily Progress     Patient Name: Hillary Singh  Date: 2017  Visit #: 3  PTA visit #: 2  Referral Diagnosis: Acute right - sided low back pain without sciatica  Referring provider: Rachel Mckeon PA-C  Visit Diagnosis:     ICD-10-CM    1. Acute right-sided low back pain without sciatica M54.5    2. Generalized muscle weakness M62.81          Assessment:   Pt is making progress towards initial goals.  Patient is benefitting from skilled physical therapy and is making steady progress toward functional goals.  Patient is appropriate to continue with skilled physical therapy intervention, as indicated by initial plan of care.    Goal Status: Ongoing  Pt. will be independent with home exercise program in : 4 weeks (to self-manage apin and improve function)  Pt will: do yardwork for 2 hours with less than 3/10 pain in 8 weeks.  Pt will: be able to walk for 60 min for return to recreational activities in 8 weeks with less than 3/10 pain. Pt has been able to walk for 40 minutes without increased symptoms    Plan / Patient Education:     Continue with initial plan of care.  Progress with home program as tolerated.   Go over HEP and progress.    Subjective:   Pt reports her back is tender and her hips are feeling better. Pt states her hips were sore yesterday possibly due to cutting her grass.  Pain Ratin        Objective:   Lumbar ROM: All planes WNL and painfree  Treatment Today     TREATMENT MINUTES COMMENTS   Evaluation     Self-care/ Home management     Manual therapy 10 MFR/STM to R lumbar paraspinals, R QL and R gluts, pt L SL   Neuromuscular Re-education     Therapeutic Activity     Therapeutic Exercises 20 See flow sheet, added to HEP: Spine bent knee fall out   Gait training     Modality__________________                Total 30    Blank areas are intentional and mean the treatment did not include these items.       Magui Lawler,SHUBHAMT  2017

## 2021-06-12 NOTE — PROGRESS NOTES
[facility]   Lumbo-Pelvic Initial Evaluation    Patient Name: Hillary Singh  Date of evaluation: 8/8/2017  Referral Diagnosis:  Lumbalgia [M54.5]  - Primary       Lumbar radiculitis [M54.16]       Myofascial pain [M79.1]       Sleep disturbance [G47.9]       Hypothyroidism [E03.9]          Referring provider: Rachel Mckeon PA-C  Visit Diagnosis:     ICD-10-CM    1. Acute right-sided low back pain without sciatica M54.5    2. Generalized muscle weakness M62.81        Assessment:      Impairments in  pain, posture, ROM, joint mobility, strength, motor function, ADL's, gait/locomotion and balance  The POC is dynamic and will be modified on an ongoing basis.  Barriers to achieving goals as noted in the assessment section may affect outcome.  Prognosis to achieve goals is  good   Skilled PT is required to reduce pain and improve function.  Pt. is appropriate for skilled PT intervention as outlined in the Plan of Care (POC).  Pt. is a good candidate for skilled PT services to improve pain levels and function.    Pt presents with right-sided low back pain that started a few years ago while lifting at work. Her pain has been intermittent since the original onset. A month ago after doing yard work her pain started again. She has pain with bending, lifting, walking, sitting and working out. She presents with + prone instability test, + neurodynamic testing and decreased hip/abdominal strength.        Goals:  Pt. will be independent with home exercise program in : 4 weeks (to self-manage apin and improve function)  Pt will: do yardwork for 2 hours with less than 3/10 pain in 8 weeks.  Pt will: be able to walk for 60 min for return to recreational activities in 8 weeks with less than 3/10 pain.    Patient's expectations/goals are realistic.    Barriers to Learning or Achieving Goals:  No Barriers.       Plan / Patient Instructions:        Plan of Care:   Communication with: Referral Source  Patient Related  Instruction: Nature of Condition;Treatment plan and rationale;Self Care instruction;Posture;Precautions;Basis of treatment;Next steps;Body mechanics;Expected outcome  Times per Week: 1  Number of Weeks: 8  Number of Visits: 8  Therapeutic Exercise: ROM;Stretching;Strengthening  Neuromuscular Reeducation: kinesio tape;posture;balance/proprioception;TNE;core  Manual Therapy: myofascial release;soft tissue mobilization;joint mobilization;muscle energy;strain counterstrain  Modalities: electrical stimulation;TENS;ultrasound;cold pack;hot pack;traction (prn)  Gait Training: to return to running/ recreational activity without pain  Equipment: theraband    Plan for next visit: progress abdominal strengthening, mulitifidus strengthening, R STM/ joint mobilizations as needed     Subjective:         Social information:   Occupation:Sr. assistant   Work Status:Working full time   Equipment Available: None    History of Present Illness:    Hillary is a 27 y.o. female who presents to therapy today with complaints of anderson low back pain that radiates to right posterior buttock/lateral thigh. Date of onset/duration of symptoms is 2017 while she was doing yard work. She has a previous history of low back pain that started in 2015 after lifting at work when she fell/collapsed. She had pain for about 6 months and feels she made it back to about 90%. She went to urgent care and received some exercises/muscle relaxants. She did not go back to follow up after. She has only been walking since the onset in July so the pain has improved. She likes to do yoga, jogging, yard work and other physical activities. She will avoid activities that she thinks will irritate her back.     Pain Ratin  Pain rating at best: 1  Pain rating at worst: 8  Pain description: aching and sharp    Functional limitations are described as occurring with:   Stand 10 min  Sit 5 min  Walk   Yard and household work  Bend  Lift  Carry  laundry/groceries    Patient reports benefit from:  hip stretches/ gennaro pose         Objective:      Note: Items left blank indicates the item was not performed or not indicated at the time of the evaluation.    Patient Outcome Measures :    Modified Oswestry Low Back Pain Disablity Questionnaire  in %: 32   Scores range from 0-100%, where a score of 0% represents minimal pain and maximal function. The minimal clinically important difference is a score reduction of 12%.    Examination  1. Acute right-sided low back pain without sciatica     2. Generalized muscle weakness       Precautions/Restrictions: None  Involved side: Right    Lumbar ROM:    Date: 8/8/2017     *Indicate scale AROM AROM AROM   Lumbar Flexion To toes     Lumbar Extension WNL with pain      Right Left Right Left Right Left   Lumbar Sidebending WNL with pain WNL with pain       Lumbar Rotation WNL WNL       Thoracic Flexion      Thoracic Extension      Thoracic Sidebending         Thoracic Rotation           Lower Extremity Strength:     Date: 8/8/2017     LE strength/5 Right Left Right Left Right Left   Hip Flexion (L1-3) 5 5       Hip Extension (L5-S1) 4- 4       Hip Abduction (L4-5) 4 4       Hip Adduction (L2-3)         Hip External Rotation 4+ 4       Hip Internal Rotation         Knee Extension (L3-4) 5 5       Knee Flexion         Ankle Dorsiflexion (L4-5) 5 5       Great Toe Extension (L5) 5 5       Ankle Plantar flexion (S1)         Abdominals        Sensation    WNL       Reflex Testing  Lumbar Dermatomes Right Left UE Reflexes Right Left   Iliac Crest and Groin (L1)   Biceps (C5-6)     Anterior Medial Thigh (L2)   Brachioradialis (C5-6)     Anterior Thigh, Medial Epicondyle Femur (L3)   Triceps (C7-8)     Lateral Thigh, Anterior Knee, Medial Leg/Malleolus (L4)   Mikaela s test     Lateral Leg, Dorsal Foot (L5)   LE Reflexes     Lateral Foot (S1)   Patellar (L3-4)     Posterior Leg (S2)   Achilles (S1-2)     Other:   Babinski Response        Palpation: tender at R lumbar paraspinals    Lumbar Special Tests:     Lumbar Special Tests Right Left SI Tests Right  Left   Quadrant test + - SI Compression - -   Straight leg raise + at 50 deg  - SI Distraction - -   Crossover response   POSH Test     Slump - - Sacral Thrust - -   Sit-up test  FADIR + -   Trunk extensor endurance test  Resisted Abduction     Prone instability test + Other: FRANK + -   Thigh thrust for SI joint pain R + L - Other: Scour - -     Repeated Motion Testing:  Peripheralizes with Extension    Passive Mobility - Joint Integrity:  WNL    LE Screen:  Hip PROM R WNL with pain with IR, other hip PROM WNL anderson    Treatment Today     TREATMENT MINUTES COMMENTS   Evaluation 20 -lumbar spine   Self-care/ Home management     Manual therapy     Neuromuscular Re-education     Therapeutic Activity     Therapeutic Exercises 25 -current stretches: gennaro pose, DKTC, piriformis stretch fig 4, LTR- went through exercises in clinic  -see exercise flow sheet  -educated on POC, diagnosis and HEP  -educated on use of ice for 20 min for pain relief as needed throughout day   Gait training     Modality__________________                Total 45    Blank areas are intentional and mean the treatment did not include these items.     PT Evaluation Code: (Please list factors)  Patient History/Comorbidities: none  Examination: lumbar spine  Clinical Presentation: stable  Clinical Decision Making: low    Patient History/  Comorbidities Examination  (body structures and functions, activity limitations, and/or participation restrictions) Clinical Presentation Clinical Decision Making (Complexity)   No documented Comorbidities or personal factors 1-2 Elements Stable and/or uncomplicated Low   1-2 documented comorbidities or personal factor 3 Elements Evolving clinical presentation with changing characteristics Moderate   3-4 documented comorbidities or personal factors 4 or more Unstable and unpredictable High                 Steffanie Hooks, PT, DPT  8/8/2017  9:34 AM

## 2021-06-12 NOTE — PROGRESS NOTES
[facility] Daily Progress     Patient Name: Hillary Singh  Date: 2017  Visit #: 6  PTA visit #: 4  Referral Diagnosis: Acute right - sided low back pain without sciatica  Referring provider: Rachel Mckeon PA-C  Visit Diagnosis:     ICD-10-CM    1. Acute right-sided low back pain without sciatica M54.5    2. Generalized muscle weakness M62.81          Assessment:   Pt is doing well. She has not had any R LE symptoms for a week.   Pt has been compliant with her HEP. She continues to walk daily.  Pt is making good progress towards initial goals.  Patient is benefitting from skilled physical therapy and is making steady progress toward functional goals.  Patient is appropriate to continue with skilled physical therapy intervention, as indicated by initial plan of care.    Goal Status: Progressing towards  Pt. will be independent with home exercise program in : 4 weeks (to self-manage apin and improve function) MET  Pt will: do yardwork for 2 hours with less than 3/10 pain in 8 weeks.  Pt will: be able to walk for 60 min for return to recreational activities in 8 weeks with less than 3/10 pain. Pt has been able to walk for 40 minutes without increased symptoms    Plan / Patient Education:     Continue with initial plan of care.  Progress with home program as tolerated.   Continue at every other week to progress strengthening, standing hip strengthening, crate lifts  Will follow up with pt in 2 weeks.    Subjective:   Pt reports she has not had any tingling in her R LE since her last visit. Pt states she feels good overall.  Pt is not having R LE symptoms or pain today.    Pt reports her exercises are going well. Pt has been walking 40-60 a day.    Pain Ratin/10        Objective:   Lumbar ROM: All planes WNL and painfree        Treatment Today     TREATMENT MINUTES COMMENTS   Evaluation     Self-care/ Home management     Manual therapy 8 -R long axis hip distraction,    -Manual R hip flexion and  ER stretching    Neuromuscular Re-education     Therapeutic Activity     Therapeutic Exercises 20 See flow sheet  Progress to green band with the clamshell, progressed to bird dogs, added to HEP: wall squats, monster walks no resistance   Gait training     Modality__________________                Total 28    Blank areas are intentional and mean the treatment did not include these items.       Magui Jara, PTA,CLT  9/11/2017

## 2021-06-12 NOTE — PROGRESS NOTES
[facility] Daily Progress     Patient Name: Hillary Singh  Date: 2017  Visit #: 5  PTA visit #: 3  Referral Diagnosis: Acute right - sided low back pain without sciatica  Referring provider: Rachel Mckeon PA-C  Visit Diagnosis:     ICD-10-CM    1. Acute right-sided low back pain without sciatica M54.5    2. Generalized muscle weakness M62.81          Assessment:   Slight increase in tingling in her R LE this past week that has already started to resolve. Educated on strategies to decreased tingling (nerve glides, walking, extension ex). Progressed abdominal strengthening with weakness/fatigue noted with quadriped exercise.  Patient is benefitting from skilled physical therapy and is making steady progress toward functional goals.  Patient is appropriate to continue with skilled physical therapy intervention, as indicated by initial plan of care.    Goal Status: Progressing towards  Pt. will be independent with home exercise program in : 4 weeks (to self-manage apin and improve function) MET  Pt will: do yardwork for 2 hours with less than 3/10 pain in 8 weeks.  Pt will: be able to walk for 60 min for return to recreational activities in 8 weeks with less than 3/10 pain. Pt has been able to walk for 40 minutes without increased symptoms    Plan / Patient Education:     Continue with initial plan of care.  Progress with home program as tolerated.   Continue at every other week to progress strengthening, standing hip strengthening, crate lifts    Subjective:   For about one week she had tingling down her entire right leg. She had minimal back and hip pain while this was happening. Walking seemed to help reduce her symptoms. She does not have any pain in her back or hip (just tightness) and tingling in just her knee and calf.    Pain Ratin/10        Objective:   Lumbar ROM: All planes WNL and painfree        Treatment Today     TREATMENT MINUTES COMMENTS   Evaluation     Self-care/ Home  management     Manual therapy 8 -R long axis hip distraction, grade III, x5 min  -Manual R hip flexion and ER stretching    Neuromuscular Re-education     Therapeutic Activity     Therapeutic Exercises 20 See flow sheet   Gait training     Modality__________________                Total 28    Blank areas are intentional and mean the treatment did not include these items.       Steffanie Hooks, PT, DPT  9/6/2017

## 2021-06-12 NOTE — PROGRESS NOTES
Assessment / Plan:     Diagnoses and all orders for this visit:    Lumbalgia  -     Ambulatory referral to Physical Therapy  -     gabapentin (NEURONTIN) 300 MG capsule; Take 1 capsule (300 mg total) by mouth daily. Increase by 1 tab every 3 days.  Max dose 900 mg tid  Dispense: 180 capsule; Refill: 2  -     XR Hip Right 2 or More VWS; Future; Expected date: 7/28/17    Lumbar radiculitis  -     Ambulatory referral to Physical Therapy  -     gabapentin (NEURONTIN) 300 MG capsule; Take 1 capsule (300 mg total) by mouth daily. Increase by 1 tab every 3 days.  Max dose 900 mg tid  Dispense: 180 capsule; Refill: 2  -     XR Hip Right 2 or More VWS; Future; Expected date: 7/28/17    Myofascial pain  -     Ambulatory referral to Physical Therapy  -     gabapentin (NEURONTIN) 300 MG capsule; Take 1 capsule (300 mg total) by mouth daily. Increase by 1 tab every 3 days.  Max dose 900 mg tid  Dispense: 180 capsule; Refill: 2  -     XR Hip Right 2 or More VWS; Future; Expected date: 7/28/17    Sleep disturbance  -     Ambulatory referral to Physical Therapy  -     gabapentin (NEURONTIN) 300 MG capsule; Take 1 capsule (300 mg total) by mouth daily. Increase by 1 tab every 3 days.  Max dose 900 mg tid  Dispense: 180 capsule; Refill: 2  -     XR Hip Right 2 or More VWS; Future; Expected date: 7/28/17    Hypothyroidism  -     Ambulatory referral to Physical Therapy          Hillary Singh is a 26 y.o. y.o. female with past medical history significant for hypothyroidism who presents today for  follow-up regarding chronic bilateral low back pain,  with the right side being worse than the left side, and intermittent radicular pain to the right gluteal right hip, right lateral thigh.  Lumbar MRI that was done on July 26 of 2017 shows small posterior midline disc protrusion at the L4-L5 without central spinal canal stenosis or neural foraminal stenosis.  At the L5-S1 there is mild broad-based annular bulge without central canal or  neural foraminal stenosis.  Patient symptoms could be due to the mild neural foraminal narrowing at the right L4-L5.  Patient also reports intermittent right groin pain that could be related to hip pathology.  I ordered hip x-ray to rule out hip pathology.  I recommend patient to start with physical therapy, gabapentin 300 mg 1 tablet 3 times daily.  She can start taking Flexeril 5 mg at nighttime for muscle spasm.  No red flags.        A shared decision making plan was used.  The patient's values and choices were respected.  The following represents what was discussed and decided upon by the physician and the patient.      1.  DIAGNOSTIC TESTS: I reviewed the lumbar MRI imaging and the report with the patient today.  She verbalizes understanding.  2.  PHYSICAL THERAPY: Patient will continue on physical therapy program.  3.  MEDICATIONS: Patient will continue on Flexeril 5 mg at nighttime.  Gabapentin 300 mg 1 tablet 3 times daily.  Side effects of the medication discussed with the patient today.  4.  INTERVENTIONS: No therapeutic injections at this time.  5.  PATIENT EDUCATION: I thoroughly discussed the plan with the patient today.  She verbalizes understanding and agrees with the plan.  6.  FOLLOW-UP: Patient will follow up with me in 4  weeks.  All questions were answered.      CASSIE Musa, MPA-C      Subjective:     Hillary Singh is a 26 y.o. female who presents today for follow-up regarding low back pain radiating to the right hip, right lateral thigh.  Today patient also reports occasional right groin pain.  She reports 1-2 out of 10 intensity pain in the right lower back radiating to the right hip, right lateral thigh.  Patient reports less pain and stiffness in the right lower back today.  patient reports worsening of his symptoms while she is mowing her lawn, pulling the weeds in her yard, bending over.  She did not start on Flexeril 5 mg at nighttime since she wanted to check the lumbar MRI  results.  Lumbar MRI that was done on July 26, 2017 shows small posterior midline disc protrusion at the L4-L5 without central spinal canal stenosis or neural foraminal stenosis.  At the L5-S1 there is mild broad-based annular bulge without central canal or neural foraminal stenosis.  There is facet arthropathy at the L5-S1 facets bilaterally.    Review of Systems:  Right-sided low back pain radiating to the right lower extremity.  Mild intermittent groin pain.Patient denies urinary or bowel incontinence, unintentional weight loss, saddle anesthesia, fever or chills, balance difficulties.       Objective:   CONSTITUTIONAL:  Vital signs as above.  No acute distress.  The patient is well nourished and well groomed.    PSYCHIATRIC:  The patient is awake, alert, oriented to person, place and time.  The patient is answering questions appropriately with clear speech.  Normal affect.  SKIN:  Skin over the face, posterior torso, bilateral upper and lower extremities is clean, dry, intact without rashes.  MUSCULOSKELETAL:  Gait is non-antalgic.  The patient is able to heel and toe walk without any difficulty.  Mild tenderness over the right L4-L5, L5-S1 lumbar paraspinal muscles.      The patient has 5/5 strength for the bilateral hip flexors, knee flexors/extensors, ankle dorsiflexors/plantar flexors, ankle evertors/invertors.    NEUROLOGICAL:  2/4 patellar, medial hamstring, achilles reflexes which are symmetric bilaterally.  No ankle clonus bilaterally.  Sensation to light touch is intact in the bilateral L4, L5, and S1 dermatomes.    Negative left straight leg raise bilaterally.  Mild pain elicited with right great leg raise.  Mild pain in the right groin with right hip impingement.  Negative Segun test bilaterally.     RESULTS:      [facility]  MR LUMBAR SPINE WO CONTRAST  7/26/2017 6:02 PM      INDICATION: Lumbalgia, right radicular pain. Right SI pain.  TECHNIQUE: Routine.  CONTRAST: None.  COMPARISON:  None.     FINDINGS: Nomenclature is based on 5 lumbar type vertebral bodies. Normal vertebral body heights, alignment and marrow signal. No pars defect. The conus tip is identified at L1. Grossly normal paraspinal soft tissues. No abdominal aortic aneurysm. The   visualized portions of the bony pelvis are normal for age.     T12-L1: Normal disc height and signal. No herniation. No facet arthropathy. No spinal canal stenosis. No right neural foraminal stenosis. No left neural foraminal stenosis.     L1-L2: Normal disc height and signal. No herniation. No facet arthropathy. No spinal canal stenosis. No right neural foraminal stenosis. No left neural foraminal stenosis.     L2-L3: Normal disc height and signal. No herniation. No facet arthropathy. No spinal canal stenosis. No right neural foraminal stenosis. No left neural foraminal stenosis.     L3-L4: Mild loss of disc signal with preservation of the interspace height. There is a mild broad-based annular bulge and very mild facet arthropathy. No spinal canal stenosis. No right neural foraminal stenosis. No left neural foraminal stenosis.     L4-L5: Mild loss of T2 disc signal with preservation of the interspace height. There is a small posterior, midline disc protrusion as seen on series 6 image 16 and series 3 image 8. Mild facet arthropathy. No central spinal canal or neural foraminal   stenosis.     L5-S1: Mild loss of T2 disc signal with preservation of the interspace height. There is very mild broad-based annular bulge and mild facet arthropathy. No central spinal canal or neural foraminal stenosis.     IMPRESSION:   CONCLUSION:  1.  Mild multilevel spondylotic changes in the lower lumbar spine without resulting central spinal canal or neural foraminal stenosis.

## 2021-06-13 NOTE — PROGRESS NOTES
[facility] Discharge Summary  Patient Name: Hillary Singh  Date: 10/2/2017  Referral Diagnosis: Acute right - sided low back pain without sciatica  Referring provider: Rachel Mckeon PA-C  Visit Diagnosis:   1. Acute right-sided low back pain without sciatica     2. Generalized muscle weakness         Goals:MET  Pt. will be independent with home exercise program in : 4 weeks (to self-manage apin and improve function)  Pt will: do yardwork for 2 hours with less than 3/10 pain in 8 weeks.  Pt will: be able to walk for 60 min for return to recreational activities in 8 weeks with less than 3/10 pain.    Patient was seen for 7 visits from 8/7/17 to 9/25/17 with 0 missed appointments.  The patient met goals and has demonstrated understanding of and independence in the home program for self-care, and progression to next steps.  She will initiate contact if questions or concerns arise.  Patient received a home program LE and abdominal strengthening, stretching  No further therapy is required at this time.  See below note for complete details towards progress.    Therapy will be discontinued at this time.  The patient will need a new referral to resume.    Thank you for your referral.  Steffanie Hooks, PT, DPT  10/2/2017  10:44 AM

## 2021-06-15 NOTE — TELEPHONE ENCOUNTER
The following [employer] message sent to the patient:  Perla Thornton,     Due to Covid19 we are operating virtual clinics with appointments either as telephone visits or video visits.  You are currently scheduled as an office visit on 2/24/21, checking in at 7:25 a.m.  We are happy to keep your visit for the scheduled time slot but will need to change it to a telephone or video visit.  I left you a voicemail as well but wanted to send you a [employer] message incase this is the best way to reach you.  Please call us at [phone] to let us know if you prefer a telephone or video visit so we can update and prepare for your appointment.     Thank you so much,     Janeen SULLIVAN - Medical Assistant for   Stefanie Heaton NP  Endocrinology

## 2021-06-15 NOTE — TELEPHONE ENCOUNTER
Patient has upcoming appointment with Stefanie Heaton NP in Endocrinology on 2/24/21, checking in at 7:25 a.m.    This appointment needs to be changed to a virtual visit as provider is running virtual clinics currently.  Please call patient and update visit to a telephone or video visit.  Thank you.    Called patient and LMTCB

## 2021-06-15 NOTE — PROGRESS NOTES
Hillary Singh is a 30 y.o. female who is being evaluated via a billable telephone visit.      What phone number would you like to be contacted at? [phone]  How would you like to obtain your AVS? AVS Preference: [place].       Reason for visit      1. Hypothyroidism    2. Hypothyroidism, unspecified type        HPI     Hillary Singh is a very pleasant 30 y.o. old female who presents for follow up.  SUMMARY:    Hillary is contacted today in f/u for hypothyroidism.  She reports that she is feeling well.  She is having no problems referable to her neck at present.  She continues to take Synthroid 137 mcg daily on an empty stomach. Current TSH is 0.38 and Free T 4 is 1.3.        Past Medical History     Patient Active Problem List   Diagnosis     Hypothyroidism       Family History       family history is not on file.    Social History      reports that she has never smoked. She has never used smokeless tobacco.      Review of Systems     Patient denies fatigue, weight changes, heat/cold intolerance, bowel/skin changes or CVS symptoms.   Remainder per HPI and per attached intake form.      Vital Signs     There were no vitals taken for this visit.  Wt Readings from Last 3 Encounters:   02/25/20 128 lb 14.4 oz (58.5 kg)   07/05/19 123 lb 3 oz (55.9 kg)   02/26/19 124 lb 8 oz (56.5 kg)       Physical Exam           Assessment     1. Hypothyroidism    2. Hypothyroidism, unspecified type            Plan     Pt is currently bio-chemically and physically euthyroid. She will remain on her current dose of Levothyroxine - 137 mcg daily, and f/u with me in 1 year, sooner with problems or concerns.         [facility] End[facility]in[facility]  2/24/2021  7:58 AM      Lab Results     TSH   Date Value Ref Range Status   02/17/2021 0.38 0.30 - 5.00 uIU/mL Final     No results found for: THYROIDAB    No results found for: S3PYFHA    Imaging Results   Last thyroid ultrasound:  No results found for this or any previous  visit.    Last thyroid nuclear scan:  No results found for this or any previous visit.    Current Medications     Outpatient Medications Prior to Visit   Medication Sig Dispense Refill     norethindrone (MICRONOR) 0.35 mg tablet Take 1 tablet by mouth daily.       SYNTHROID 137 mcg tablet TAKE ONE TABLET BY MOUTH EVERY DAY AT 6 AM 30 tablet 0     buPROPion (WELLBUTRIN XL) 150 MG 24 hr tablet Take 1 tablet (150 mg total) by mouth daily. 30 tablet 0     ibuprofen (ADVIL,MOTRIN) 200 MG tablet Take 200 mg by mouth every 6 (six) hours as needed for pain.       No facility-administered medications prior to visit.          Phone call duration: 20 minutes      Labs dated 2/17/21:  TSH: 0.38  T4, Free: 1.3  Potassium: 4.1  Creatinine: 0.73  Vitamin D: X

## 2021-06-16 NOTE — PROGRESS NOTES
"[facility]  ENDOCRINOLOGY    Thyroid Note  2/26/2018    Hillary Singh, 1990, [id number]          Reason for visit      1. Hypothyroidism, unspecified type        HPI     Hillary Singh is a very pleasant 27 y.o. old female who presents for follow up.  SUMMARY:  Hillary returns today in f/u for Hypothyroidism.  She has been taking Synthroid 137 mcg daily on an empty stomach for some time now.  She reports that she is feeling well and is having no problems referable to her neck at present.  Her current TSH is 0.36 and Free T4 is 1.3. She states that her energy level is good.      Past Medical History     Patient Active Problem List   Diagnosis     Hypothyroidism       Family History       family history is not on file.    Social History      reports that she has never smoked. She does not have any smokeless tobacco history on file.      Review of Systems     Patient denies fatigue, weight changes, heat/cold intolerance, bowel/skin changes or CVS symptoms.   Remainder per HPI and per attached intake form.      Vital Signs     /60  Ht 5' 7.5\" (1.715 m)  Wt 134 lb 11.2 oz (61.1 kg)  BMI 20.79 kg/m2  Wt Readings from Last 3 Encounters:   02/22/18 134 lb 11.2 oz (61.1 kg)   08/25/17 133 lb 9.6 oz (60.6 kg)   07/28/17 134 lb 4.8 oz (60.9 kg)       Physical Exam     General:  Normal, NIRD,appears euthyroid  Eyes:  Pupils equal, round and reactive to light; no proptosis, lid lag or  periorbital edema.  Thyroid:  Thyroid is normal.  No tenderness or bruit  Neck: No lymph nodes  Musculoskeletal:  Muscle strength grossly normal without evidence of wasting.  Heart:  Regular rate and rhythm without murmur.  Lungs:  Clear to auscultation.  Abdomen: Soft, non-tender, no masses or organomegaly  Neuro: Patella Reflexes were normal.No tremors  Skin:  No acanthosis nigricans or vitiligo        Assessment     1. Hypothyroidism, unspecified type            Plan       Pt is currently bio-chemically and physically " euthyroid.  She will continue taking 137 mcg of Synthroid daily on an empty stomach.  Refills provided today. F/u in 1 year.  Time spent with pt today: 25 min with >50% spent in counseling and coordination of care.        [facility] Endocrin[facility]  2/26/2018  9:42 AM      Lab Results     TSH   Date Value Ref Range Status   01/29/2018 0.36 0.30 - 5.00 uIU/mL Final     No results found for: THYROIDAB    No results found for: A7PGBIN    Imaging Results   Last thyroid ultrasound:  No results found for this or any previous visit.    Last thyroid nuclear scan:  No results found for this or any previous visit.    Current Medications     Outpatient Medications Prior to Visit   Medication Sig Dispense Refill     norethindrone (MICRONOR) 0.35 mg tablet Take 1 tablet by mouth daily.       SYNTHROID 137 mcg tablet TAKE ONE TABLET BY MOUTH EVERY DAY AT 6 AM 90 tablet 0     No facility-administered medications prior to visit.

## 2021-06-24 NOTE — PROGRESS NOTES
"[facility]  ENDOCRINOLOGY    Thyroid Note  2/26/2019    Hillary Singh, 1990, [id number]          Reason for visit      1. Hypothyroidism, unspecified type        HPI     Hillary Singh is a very pleasant 28 y.o. old female who presents for follow up.  SUMMARY:  Hillary returns today in f/u for hypothyroidism.  She reports that she is feeling well.  She is having no problems referable to her neck at present.  She continues to take Synthroid 137 mcg daily on an empty stomach. Current TSH is 0.79 and Free T 4 is 1.2.  She mentions that often in the winter that she feels a little more \"down\".  She has never had a Vit D level drawn.         Past Medical History     Patient Active Problem List   Diagnosis     Hypothyroidism       Family History       family history is not on file.    Social History      reports that  has never smoked. She does not have any smokeless tobacco history on file.      Review of Systems     Patient denies fatigue, weight changes, heat/cold intolerance, bowel/skin changes or CVS symptoms.   Remainder per HPI and per attached intake form.      Vital Signs     BP 90/58 (Patient Site: Right Arm, Patient Position: Sitting, Cuff Size: Adult Regular)   Pulse 76   Ht 5' 7.5\" (1.715 m)   Wt 124 lb 8 oz (56.5 kg)   BMI 19.21 kg/m    Wt Readings from Last 3 Encounters:   02/26/19 124 lb 8 oz (56.5 kg)   02/22/18 134 lb 11.2 oz (61.1 kg)   08/25/17 133 lb 9.6 oz (60.6 kg)       Physical Exam     General:  Normal, NIRD,appears euthyroid  Eyes:  Pupils equal, round and reactive to light; no proptosis, lid lag or  periorbital edema.  Thyroid:  Thyroid is normal.  No tenderness or bruit  Neck: No lymph nodes  Musculoskeletal:  Muscle strength grossly normal without evidence of wasting.  Heart:  Regular rate and rhythm without murmur.  Lungs:  Clear to auscultation.  Abdomen: Soft, non-tender, no masses or organomegaly  Neuro: Patella Reflexes were normal.No tremors  Skin:  No acanthosis " nigricans or vitiligo        Assessment     1. Hypothyroidism, unspecified type            Plan       Hillary is bio-chemically and physically euthyroid.  She will continue on Synthroid 137 mcg daily.  Refill provided.  She will f/u in 1 year, sooner if necessary.  She is going to start taking Vit D in supplementation during the winter months and see if this is helpful. Time spent with pt today: 25 min with >50% spent in counseling and coordination of care.        [facility] Endocrin[facility]  2/26/2019  10:31 AM        Lab Results     TSH   Date Value Ref Range Status   02/19/2019 0.79 0.30 - 5.00 uIU/mL Final     No results found for: THYROIDAB    No results found for: F7BGKVS    Imaging Results   Last thyroid ultrasound:  No results found for this or any previous visit.    Last thyroid nuclear scan:  No results found for this or any previous visit.    Current Medications     Outpatient Medications Prior to Visit   Medication Sig Dispense Refill     norethindrone (MICRONOR) 0.35 mg tablet Take 1 tablet by mouth daily.       SYNTHROID 137 mcg tablet TAKE ONE TABLET BY MOUTH EVERY DAY AT 6 AM 90 tablet 3     No facility-administered medications prior to visit.

## 2021-07-03 NOTE — ADDENDUM NOTE
Addendum Note by Silva Heaton NP at 7/5/2017  8:27 AM     Author: Silva Heaton NP Service: -- Author Type: Nurse Practitioner    Filed: 7/5/2017  8:27 AM Encounter Date: 7/3/2017 Status: Signed    : Silva Heaton NP (Nurse Practitioner)    Addended by: SILVA HEATON on: 7/5/2017 08:27 AM        Modules accepted: Orders

## 2021-08-07 ENCOUNTER — HEALTH MAINTENANCE LETTER (OUTPATIENT)
Age: 31
End: 2021-08-07

## 2021-10-02 ENCOUNTER — HEALTH MAINTENANCE LETTER (OUTPATIENT)
Age: 31
End: 2021-10-02

## 2021-10-29 ENCOUNTER — TELEPHONE (OUTPATIENT)
Dept: ENDOCRINOLOGY | Facility: CLINIC | Age: 31
End: 2021-10-29
Payer: COMMERCIAL

## 2021-10-29 NOTE — TELEPHONE ENCOUNTER
Please submit a PA for synthroid 137 mcg daily. Levothyroxine was ineffective in managing the pt's symptoms and TSH related to hypothyroidism.

## 2021-11-01 NOTE — TELEPHONE ENCOUNTER
Central Prior Authorization Team   Phone: [phone]    PA Initiation    Medication:   Insurance Company:    Pharmacy Filling the Rx: [id number] [place] [address]  Filling Pharmacy Phone: [phone]  Filling Pharmacy Fax: [phone]  Start Date: 11/1/2021

## 2021-11-03 NOTE — TELEPHONE ENCOUNTER
PA not needed.   Unable to verify if script processed, due to pharmacy listed is not current.

## 2021-11-11 DIAGNOSIS — E03.9 HYPOTHYROIDISM, UNSPECIFIED TYPE: ICD-10-CM

## 2021-11-11 RX ORDER — BUPROPION HYDROCHLORIDE 150 MG/1
TABLET ORAL
Qty: 90 TABLET | Refills: 1 | Status: SHIPPED | OUTPATIENT
Start: 2021-11-11 | End: 2022-05-20

## 2021-11-16 DIAGNOSIS — E03.9 HYPOTHYROIDISM, UNSPECIFIED TYPE: ICD-10-CM

## 2021-11-16 RX ORDER — LEVOTHYROXINE SODIUM 137 MCG
TABLET ORAL
Qty: 90 TABLET | Refills: 3 | Status: CANCELLED | OUTPATIENT
Start: 2021-11-16

## 2021-11-16 NOTE — TELEPHONE ENCOUNTER
ALTERNATIVE REQUESTED  NOT COVERED  OR DO A PA    Refill Request  Medication name: Pending Prescriptions:                       Disp   Refills    SYNTHROID 137 MCG tablet                  90 tab*3          Who prescribed the medication: CINTHYA  Last refill on medication: 02/24/21  Requested [facility]  Last appointment with PCP: 02/24/21  Next appointment: Appointment scheduled for 02/23/22

## 2021-11-23 NOTE — TELEPHONE ENCOUNTER
Original request was done for the brand and insurance states a PA is not needed.  Spoke insurance rep, he stated patient's insurance will only cover a 30 day supply when filled at retail pharmacy.  Pharmacy was trying to process for 90 day supply, which is only covered if patient fills with a mail order pharmacy.  Spoke with pharmacist, advised him to process for 30 day supply.  He received a paid claim.  No PA needed.

## 2022-01-12 ENCOUNTER — TELEPHONE (OUTPATIENT)
Dept: ENDOCRINOLOGY | Facility: CLINIC | Age: 32
End: 2022-01-12
Payer: COMMERCIAL

## 2022-01-12 NOTE — TELEPHONE ENCOUNTER
PA Initiation    Medication: SYNTHROID 137 MCG tablet- INTIIATED  Insurance Company: [employer] - Phone [phone] Fax [phone]  Pharmacy Filling the Rx: [email or web address] - [place], [place] - [address] [place] [address]  Filling Pharmacy Phone: [phone]  Filling Pharmacy Fax: [phone]  Start Date: 1/12/2022

## 2022-01-12 NOTE — TELEPHONE ENCOUNTER
Prior Authorization Approval    Authorization Effective Date: 12/13/2021  Authorization Expiration Date: 1/12/2023  Medication: SYNTHROID 137 MCG tablet- APPROVED  Approved Dose/Quantity: 90 FOR 90 DAYS  Reference #: [id number]   Insurance Company: [employer] - Phone [phone] Fax [phone]  Expected CoPay:       CoPay Card Available:      Foundation Assistance Needed:    Which Pharmacy is filling the prescription (Not needed for infusion/clinic administered): [facility] - [address]  Pharmacy Notified: Yes, INITIATED BY [employer]  Patient Notified: Pharmacy will notify patient when ready          Central Prior Authorization Team  Phone: [phone]

## 2022-02-15 DIAGNOSIS — E03.9 HYPOTHYROIDISM, UNSPECIFIED TYPE: Primary | ICD-10-CM

## 2022-02-16 ENCOUNTER — LAB (OUTPATIENT)
Dept: LAB | Facility: CLINIC | Age: 32
End: 2022-02-16
Payer: COMMERCIAL

## 2022-02-16 DIAGNOSIS — E03.9 HYPOTHYROIDISM, UNSPECIFIED TYPE: ICD-10-CM

## 2022-02-16 LAB
CREAT SERPL-MCNC: 0.83 MG/DL (ref 0.6–1.1)
GFR SERPL CREATININE-BSD FRML MDRD: >90 ML/MIN/1.73M2
T4 FREE SERPL-MCNC: 1.32 NG/DL (ref 0.7–1.8)
TSH SERPL DL<=0.005 MIU/L-ACNC: 2.9 UIU/ML (ref 0.3–5)

## 2022-02-16 PROCEDURE — 82306 VITAMIN D 25 HYDROXY: CPT

## 2022-02-16 PROCEDURE — 84443 ASSAY THYROID STIM HORMONE: CPT

## 2022-02-16 PROCEDURE — 84439 ASSAY OF FREE THYROXINE: CPT

## 2022-02-16 PROCEDURE — 82565 ASSAY OF CREATININE: CPT

## 2022-02-16 PROCEDURE — 36415 COLL VENOUS BLD VENIPUNCTURE: CPT

## 2022-02-16 ASSESSMENT — ENCOUNTER SYMPTOMS
HOT FLASHES: 0
DECREASED LIBIDO: 0

## 2022-02-17 LAB — DEPRECATED CALCIDIOL+CALCIFEROL SERPL-MC: 31 UG/L (ref 30–80)

## 2022-02-23 ENCOUNTER — OFFICE VISIT (OUTPATIENT)
Dept: ENDOCRINOLOGY | Facility: CLINIC | Age: 32
End: 2022-02-23
Payer: COMMERCIAL

## 2022-02-23 VITALS
DIASTOLIC BLOOD PRESSURE: 58 MMHG | SYSTOLIC BLOOD PRESSURE: 106 MMHG | HEART RATE: 88 BPM | BODY MASS INDEX: 20.23 KG/M2 | HEIGHT: 67 IN | WEIGHT: 128.9 LBS

## 2022-02-23 DIAGNOSIS — E03.9 HYPOTHYROIDISM, UNSPECIFIED TYPE: ICD-10-CM

## 2022-02-23 PROBLEM — L73.9 FOLLICULITIS: Status: ACTIVE | Noted: 2022-02-23

## 2022-02-23 PROCEDURE — 99213 OFFICE O/P EST LOW 20 MIN: CPT | Performed by: NURSE PRACTITIONER

## 2022-02-23 RX ORDER — LEVOTHYROXINE SODIUM 137 MCG
TABLET ORAL
Qty: 90 TABLET | Refills: 3 | Status: SHIPPED | OUTPATIENT
Start: 2022-02-23 | End: 2023-03-01

## 2022-02-23 NOTE — PROGRESS NOTES
[facility] ENDOCRINOLOGY    Thyroid Note  2/23/2022    Hillary Singh, 1990, [id number]          Reason for visit      1. Hypothyroidism, unspecified type        HPI     Hillary Singh is a very pleasant 31 year old old female who presents for follow up.  SUMMARY:    Hillary is here today in f/u for Hypothyroidism. She reports that she is feeling well. Unfortunately, in the last year, she has been without some medication because her Insurance decided not to cover Synthroid any more. She reports that she became symptomatic at the time. She has been back on it for a while now and feels much better. She is unable to take Levothyroxine because she didn't really respond well to it and has done much better with the Synthroid. She is taking 137 mcg daily. Labs showed TSH of 2.9 and fT4 of 1.32.  Also noted was a Vit D level of 31. She had been taking her supplement intermittently, and is now taking it regularly again. She is having no problems referable to her neck.       Past Medical History     Patient Active Problem List   Diagnosis     Hypothyroidism     Folliculitis       Family History       family history is not on file.    Social History      reports that she has never smoked. She has never used smokeless tobacco.      Review of Systems     Patient denies fatigue, weight changes, heat/cold intolerance, bowel/skin changes or CVS symptoms.   Remainder per HPI and per attached intake form.    Answers for HPI/ROS submitted by the patient on 2/16/2022  General Symptoms: No  Skin Symptoms: No  HENT Symptoms: No  EYE SYMPTOMS: No  HEART SYMPTOMS: No  LUNG SYMPTOMS: No  INTESTINAL SYMPTOMS: No  URINARY SYMPTOMS: No  GYNECOLOGIC SYMPTOMS: Yes  BREAST SYMPTOMS: No  SKELETAL SYMPTOMS: No  BLOOD SYMPTOMS: No  NERVOUS SYSTEM SYMPTOMS: No  MENTAL HEALTH SYMPTOMS: No  Bleeding or spotting between periods: Yes  Heavy or painful periods: No  Irregular periods: Yes  Vaginal discharge: No  Hot flashes: No  Vaginal  "dryness: No  Genital ulcers: No  Reduced libido: No  Painful intercourse: No  Difficulty with sexual arousal: No  Post-menopausal bleeding: No        Vital Signs     /58 (BP Location: Right arm, Patient Position: Sitting, Cuff Size: Adult Regular)   Pulse 88   Ht 1.704 m (5' 7.09\")   Wt 58.5 kg (128 lb 14.4 oz)   BMI 20.14 kg/m    Wt Readings from Last 3 Encounters:   02/23/22 58.5 kg (128 lb 14.4 oz)   02/25/20 58.5 kg (128 lb 14.4 oz)   07/05/19 55.9 kg (123 lb 3 oz)       Physical Exam     General:  Normal, NIRD,appears euthyroid  Eyes:  Pupils equal, round and reactive to light; no proptosis, lid lag or  periorbital edema.  Thyroid:  Thyroid is normal.  No tenderness or bruit  Neck: No lymph nodes  Musculoskeletal:  Muscle strength grossly normal without evidence of wasting.  Heart:  Regular rate and rhythm without murmur.  Lungs:  Clear to auscultation.  Abdomen: Soft, non-tender, no masses or organomegaly  Neuro: Patella Reflexes were normal.No tremors  Skin:  No acanthosis nigricans or vitiligo          Assessment     1. Hypothyroidism, unspecified type            Plan     Pt is bio-chemically and physically euthyroid. She will remain on her current dose of Synthroid and will f/u with me in 1 year.         Korina Heaton NP   Endocrinology  2/23/2022  7:38 AM      Lab Results     TSH   Date Value Ref Range Status   02/16/2022 2.90 0.30 - 5.00 uIU/mL Final     No components found for: THYROIDAB    No results found for: T2FRYRL    Imaging Results   Last thyroid ultrasound:  No results found for this or any previous visit.      Last thyroid nuclear scan:  No results found for this or any previous visit.      Current Medications     Outpatient Medications Prior to Visit   Medication Sig Dispense Refill     buPROPion (WELLBUTRIN XL) 150 MG 24 hr tablet TAKE 1 TABLET BY MOUTH EVERY DAY 90 tablet 1     norethindrone (MICRONOR) 0.35 mg tablet [NORETHINDRONE (MICRONOR) 0.35 MG TABLET] Take 1 tablet by " mouth daily.       SYNTHROID 137 MCG tablet [SYNTHROID 137 MCG TABLET] TAKE ONE TABLET BY MOUTH EVERY DAY AT 6 AM 90 tablet 1     No facility-administered medications prior to visit.

## 2022-05-20 DIAGNOSIS — E03.9 HYPOTHYROIDISM, UNSPECIFIED TYPE: ICD-10-CM

## 2022-05-20 RX ORDER — BUPROPION HYDROCHLORIDE 150 MG/1
TABLET ORAL
Qty: 90 TABLET | Refills: 2 | Status: SHIPPED | OUTPATIENT
Start: 2022-05-20 | End: 2023-02-17

## 2022-08-28 ENCOUNTER — HEALTH MAINTENANCE LETTER (OUTPATIENT)
Age: 32
End: 2022-08-28

## 2023-01-14 ENCOUNTER — HEALTH MAINTENANCE LETTER (OUTPATIENT)
Age: 33
End: 2023-01-14

## 2023-02-17 ENCOUNTER — TELEPHONE (OUTPATIENT)
Dept: ENDOCRINOLOGY | Facility: CLINIC | Age: 33
End: 2023-02-17
Payer: COMMERCIAL

## 2023-02-17 DIAGNOSIS — E03.9 HYPOTHYROIDISM, UNSPECIFIED TYPE: ICD-10-CM

## 2023-02-21 DIAGNOSIS — E03.9 HYPOTHYROIDISM, UNSPECIFIED TYPE: Primary | ICD-10-CM

## 2023-02-21 RX ORDER — BUPROPION HYDROCHLORIDE 150 MG/1
TABLET ORAL
Qty: 30 TABLET | Refills: 0 | Status: SHIPPED | OUTPATIENT
Start: 2023-02-21 | End: 2023-03-15

## 2023-02-27 ENCOUNTER — LAB (OUTPATIENT)
Dept: LAB | Facility: CLINIC | Age: 33
End: 2023-02-27
Payer: COMMERCIAL

## 2023-02-27 DIAGNOSIS — E03.9 HYPOTHYROIDISM, UNSPECIFIED TYPE: ICD-10-CM

## 2023-02-27 LAB
CREAT SERPL-MCNC: 0.87 MG/DL (ref 0.51–0.95)
DEPRECATED CALCIDIOL+CALCIFEROL SERPL-MC: 20 UG/L (ref 20–75)
GFR SERPL CREATININE-BSD FRML MDRD: 90 ML/MIN/1.73M2
T4 FREE SERPL-MCNC: 1.75 NG/DL (ref 0.9–1.7)
TSH SERPL DL<=0.005 MIU/L-ACNC: 0.29 UIU/ML (ref 0.3–4.2)

## 2023-02-27 PROCEDURE — 82565 ASSAY OF CREATININE: CPT

## 2023-02-27 PROCEDURE — 82306 VITAMIN D 25 HYDROXY: CPT

## 2023-02-27 PROCEDURE — 36415 COLL VENOUS BLD VENIPUNCTURE: CPT

## 2023-02-27 PROCEDURE — 84443 ASSAY THYROID STIM HORMONE: CPT

## 2023-02-27 PROCEDURE — 84439 ASSAY OF FREE THYROXINE: CPT

## 2023-02-27 ASSESSMENT — ENCOUNTER SYMPTOMS
DECREASED APPETITE: 0
NERVOUS/ANXIOUS: 1
WEIGHT LOSS: 0
NAIL CHANGES: 0
TROUBLE SWALLOWING: 0
CHILLS: 1
POLYPHAGIA: 0
PANIC: 0
SMELL DISTURBANCE: 0
FEVER: 1
INSOMNIA: 1
WEIGHT GAIN: 0
DEPRESSION: 0
POLYDIPSIA: 0
SORE THROAT: 0
POOR WOUND HEALING: 0
HOT FLASHES: 0
HALLUCINATIONS: 0
HOARSE VOICE: 0
SKIN CHANGES: 0
FATIGUE: 0
DECREASED LIBIDO: 0
INCREASED ENERGY: 0
TASTE DISTURBANCE: 0
DECREASED CONCENTRATION: 1
NIGHT SWEATS: 0
ALTERED TEMPERATURE REGULATION: 0
NECK MASS: 0
SINUS PAIN: 0
SINUS CONGESTION: 0

## 2023-03-01 ENCOUNTER — OFFICE VISIT (OUTPATIENT)
Dept: ENDOCRINOLOGY | Facility: CLINIC | Age: 33
End: 2023-03-01
Payer: COMMERCIAL

## 2023-03-01 VITALS
DIASTOLIC BLOOD PRESSURE: 64 MMHG | SYSTOLIC BLOOD PRESSURE: 98 MMHG | HEART RATE: 76 BPM | BODY MASS INDEX: 19.84 KG/M2 | WEIGHT: 127 LBS

## 2023-03-01 DIAGNOSIS — E03.9 HYPOTHYROIDISM, UNSPECIFIED TYPE: ICD-10-CM

## 2023-03-01 PROBLEM — L73.9 FOLLICULITIS: Status: RESOLVED | Noted: 2022-02-23 | Resolved: 2023-03-01

## 2023-03-01 PROCEDURE — 99213 OFFICE O/P EST LOW 20 MIN: CPT | Performed by: NURSE PRACTITIONER

## 2023-03-01 RX ORDER — LEVOTHYROXINE SODIUM 137 MCG
TABLET ORAL
Qty: 90 TABLET | Refills: 3 | Status: SHIPPED | OUTPATIENT
Start: 2023-03-01 | End: 2024-05-28

## 2023-03-01 NOTE — PROGRESS NOTES
[facility] ENDOCRINOLOGY    Thyroid Note  3/1/2023    Hillary Singh, 1990, [id number]          Reason for visit      1. Hypothyroidism, unspecified type        HPI     Hillary Singh is a very pleasant 32 year old old female who presents for follow up.  SUMMARY:    Hillary is here today in f/u for Hypothyroidism. She reports that she is feeling well. Pt has had Synthroid over the last year and continues to feel well.  She is unable to take Levothyroxine because she didn't really respond well to it and has done much better with the Synthroid. She is taking 137 mcg daily. Labs showed TSH of 0.29 and fT4 of 1.75.  She notes that she has been experiencing more Anxiety recently.  This TSH is much lower than she usually runs. She is having no problems referable to her neck at present.     Past Medical History     Patient Active Problem List   Diagnosis     Hypothyroidism       Family History       family history is not on file.    Social History      reports that she has never smoked. She has never used smokeless tobacco.      Review of Systems     Patient denies fatigue, weight changes, heat/cold intolerance, bowel/skin changes or CVS symptoms.   Remainder per HPI and per attached intake form.  Answers for HPI/ROS submitted by the patient on 2/27/2023  General Symptoms: Yes  Skin Symptoms: Yes  HENT Symptoms: Yes  EYE SYMPTOMS: No  HEART SYMPTOMS: No  LUNG SYMPTOMS: No  INTESTINAL SYMPTOMS: No  URINARY SYMPTOMS: No  GYNECOLOGIC SYMPTOMS: Yes  BREAST SYMPTOMS: No  SKELETAL SYMPTOMS: No  BLOOD SYMPTOMS: No  NERVOUS SYSTEM SYMPTOMS: No  MENTAL HEALTH SYMPTOMS: Yes  Ear pain: No  Ear discharge: No  Hearing loss: No  Tinnitus: No  Nosebleeds: No  Congestion: No  Sinus pain: No  Trouble swallowing: No   Voice hoarseness: No  Mouth sores: Yes  Sore throat: No  Tooth pain: No  Gum tenderness: No  Bleeding gums: No  Change in taste: No  Change in sense of smell: No  Dry mouth: No  Hearing aid used: No  Neck  lump: No  Fever: Yes  Loss of appetite: No  Weight loss: No  Weight gain: No  Fatigue: No  Night sweats: No  Chills: Yes  Increased stress: No  Excessive hunger: No  Excessive thirst: No  Feeling hot or cold when others believe the temperature is normal: No  Loss of height: No  Post-operative complications: No  Surgical site pain: No  Hallucinations: No  Change in or Loss of Energy: No  Hyperactivity: No  Confusion: No  Changes in hair: No  Changes in moles/birth marks: No  Itching: Yes  Rashes: No  Changes in nails: No  Acne: Yes  Hair in places you don't want it: No  Change in facial hair: No  Warts: No  Non-healing sores: No  Scarring: Yes  Flaking of skin: No  Color changes of hands/feet in cold : No  Sun sensitivity: No  Skin thickening: No  Bleeding or spotting between periods: No  Heavy or painful periods: No  Irregular periods: Yes  Vaginal discharge: No  Hot flashes: No  Vaginal dryness: No  Genital ulcers: No  Reduced libido: No  Painful intercourse: No  Difficulty with sexual arousal: No  Post-menopausal bleeding: No  Nervous or Anxious: Yes  Depression: No  Trouble sleeping: Yes  Trouble thinking or concentrating: Yes  Mood changes: No  Panic attacks: No        Vital Signs     BP 98/64 (BP Location: Right arm, Patient Position: Sitting, Cuff Size: Adult Regular)   Pulse 76   Wt 57.6 kg (127 lb)   BMI 19.84 kg/m    Wt Readings from Last 3 Encounters:   03/01/23 57.6 kg (127 lb)   02/23/22 58.5 kg (128 lb 14.4 oz)   02/25/20 58.5 kg (128 lb 14.4 oz)       Physical Exam     General:  Normal, NIRD,appears euthyroid  Eyes:  Pupils equal, round and reactive to light; no proptosis, lid lag or  periorbital edema.  Thyroid:  Thyroid is normal.  No tenderness or bruit  Neck: No lymph nodes  Musculoskeletal:  Muscle strength grossly normal without evidence of wasting.  Heart:  Regular rate and rhythm without murmur.  Lungs:  Clear to auscultation.  Abdomen: Soft, non-tender, no masses or organomegaly  Neuro:  Patella Reflexes were normal.No tremors  Skin:  No acanthosis nigricans or vitiligo          Assessment     1. Hypothyroidism, unspecified type            Plan       With discussion, she will drop one dose a week and we will see if her Anxiety improves and what her labs look like in 2 months.  Refill provided today.       Korina Heaton NP  [facility]  3/1/2023  9:29 AM        Lab Results     TSH   Date Value Ref Range Status   02/27/2023 0.29 (L) 0.30 - 4.20 uIU/mL Final   02/16/2022 2.90 0.30 - 5.00 uIU/mL Final     No components found for: THYROIDAB    No results found for: V3TAVZX    Imaging Results   Last thyroid ultrasound:  No results found for this or any previous visit.      Last thyroid nuclear scan:  No results found for this or any previous visit.      Current Medications     Outpatient Medications Prior to Visit   Medication Sig Dispense Refill     buPROPion (WELLBUTRIN XL) 150 MG 24 hr tablet TAKE 1 TABLET BY MOUTH EVERY DAY 30 tablet 0     norethindrone (MICRONOR) 0.35 mg tablet [NORETHINDRONE (MICRONOR) 0.35 MG TABLET] Take 1 tablet by mouth daily.       SYNTHROID 137 MCG tablet [SYNTHROID 137 MCG TABLET] TAKE ONE TABLET BY MOUTH EVERY DAY AT 6 AM 90 tablet 3     No facility-administered medications prior to visit.

## 2023-03-15 DIAGNOSIS — E03.9 HYPOTHYROIDISM, UNSPECIFIED TYPE: ICD-10-CM

## 2023-03-15 RX ORDER — BUPROPION HYDROCHLORIDE 150 MG/1
TABLET ORAL
Qty: 30 TABLET | Refills: 0 | Status: SHIPPED | OUTPATIENT
Start: 2023-03-15 | End: 2023-04-13

## 2023-03-15 NOTE — TELEPHONE ENCOUNTER
"Requested Prescriptions   Pending Prescriptions Disp Refills     buPROPion (WELLBUTRIN XL) 150 MG 24 hr tablet [Pharmacy Med Name: BUPROPION HCL  MG TABLET] 30 tablet 0     Sig: TAKE 1 TABLET BY MOUTH EVERY DAY       SSRIs Protocol Passed - 3/15/2023  1:30 PM        Passed - Recent (12 mo) or future (30 days) visit within the authorizing provider's specialty     Patient has had an office visit with the authorizing provider or a provider within the authorizing providers department within the previous 12 mos or has a future within next 30 days. See \"Patient Info\" tab in inbasket, or \"Choose Columns\" in Meds & Orders section of the refill encounter.              Passed - Medication is Bupropion     If the medication is Bupropion (Wellbutrin), and the patient is taking for smoking cessation; OK to refill.          Passed - Medication is active on med list        Passed - Patient is age 18 or older        Passed - No active pregnancy on record        Passed - No positive pregnancy test in last 12 months             "

## 2023-04-13 DIAGNOSIS — E03.9 HYPOTHYROIDISM, UNSPECIFIED TYPE: ICD-10-CM

## 2023-04-13 RX ORDER — BUPROPION HYDROCHLORIDE 150 MG/1
TABLET ORAL
Qty: 30 TABLET | Refills: 0 | Status: SHIPPED | OUTPATIENT
Start: 2023-04-13 | End: 2023-05-12

## 2023-04-13 NOTE — TELEPHONE ENCOUNTER
"Requested Prescriptions   Pending Prescriptions Disp Refills     buPROPion (WELLBUTRIN XL) 150 MG 24 hr tablet [Pharmacy Med Name: BUPROPION HCL  MG TABLET] 30 tablet 0     Sig: TAKE 1 TABLET BY MOUTH EVERY DAY       SSRIs Protocol Passed - 4/13/2023 11:30 AM        Passed - Recent (12 mo) or future (30 days) visit within the authorizing provider's specialty     Patient has had an office visit with the authorizing provider or a provider within the authorizing providers department within the previous 12 mos or has a future within next 30 days. See \"Patient Info\" tab in inbasket, or \"Choose Columns\" in Meds & Orders section of the refill encounter.              Passed - Medication is Bupropion     If the medication is Bupropion (Wellbutrin), and the patient is taking for smoking cessation; OK to refill.          Passed - Medication is active on med list        Passed - Patient is age 18 or older        Passed - No active pregnancy on record        Passed - No positive pregnancy test in last 12 months             "

## 2023-05-05 ENCOUNTER — LAB (OUTPATIENT)
Dept: LAB | Facility: CLINIC | Age: 33
End: 2023-05-05
Payer: COMMERCIAL

## 2023-05-05 DIAGNOSIS — E03.9 HYPOTHYROIDISM, UNSPECIFIED TYPE: ICD-10-CM

## 2023-05-05 LAB
T4 FREE SERPL-MCNC: 1.66 NG/DL (ref 0.9–1.7)
TSH SERPL DL<=0.005 MIU/L-ACNC: 2.84 UIU/ML (ref 0.3–4.2)

## 2023-05-05 PROCEDURE — 36415 COLL VENOUS BLD VENIPUNCTURE: CPT

## 2023-05-05 PROCEDURE — 84439 ASSAY OF FREE THYROXINE: CPT

## 2023-05-05 PROCEDURE — 84443 ASSAY THYROID STIM HORMONE: CPT

## 2023-05-12 ENCOUNTER — MYC MEDICAL ADVICE (OUTPATIENT)
Dept: ENDOCRINOLOGY | Facility: CLINIC | Age: 33
End: 2023-05-12
Payer: COMMERCIAL

## 2023-05-12 DIAGNOSIS — E03.9 HYPOTHYROIDISM, UNSPECIFIED TYPE: ICD-10-CM

## 2023-05-12 RX ORDER — BUPROPION HYDROCHLORIDE 150 MG/1
TABLET ORAL
Qty: 30 TABLET | Refills: 0 | Status: SHIPPED | OUTPATIENT
Start: 2023-05-12 | End: 2023-06-05

## 2023-05-12 NOTE — TELEPHONE ENCOUNTER
"Requested Prescriptions   Pending Prescriptions Disp Refills     buPROPion (WELLBUTRIN XL) 150 MG 24 hr tablet [Pharmacy Med Name: BUPROPION HCL  MG TABLET] 30 tablet 0     Sig: TAKE 1 TABLET BY MOUTH EVERY DAY       SSRIs Protocol Passed - 5/12/2023  8:35 AM        Passed - Recent (12 mo) or future (30 days) visit within the authorizing provider's specialty     Patient has had an office visit with the authorizing provider or a provider within the authorizing providers department within the previous 12 mos or has a future within next 30 days. See \"Patient Info\" tab in inbasket, or \"Choose Columns\" in Meds & Orders section of the refill encounter.              Passed - Medication is Bupropion     If the medication is Bupropion (Wellbutrin), and the patient is taking for smoking cessation; OK to refill.          Passed - Medication is active on med list        Passed - Patient is age 18 or older        Passed - No active pregnancy on record        Passed - No positive pregnancy test in last 12 months             "

## 2023-06-03 DIAGNOSIS — E03.9 HYPOTHYROIDISM, UNSPECIFIED TYPE: ICD-10-CM

## 2023-06-05 RX ORDER — BUPROPION HYDROCHLORIDE 150 MG/1
TABLET ORAL
Qty: 90 TABLET | Refills: 0 | Status: SHIPPED | OUTPATIENT
Start: 2023-06-05

## 2023-06-05 NOTE — TELEPHONE ENCOUNTER
"Requested Prescriptions   Pending Prescriptions Disp Refills     buPROPion (WELLBUTRIN XL) 150 MG 24 hr tablet [Pharmacy Med Name: BUPROPION HCL  MG TABLET] 30 tablet 0     Sig: TAKE 1 TABLET BY MOUTH EVERY DAY       SSRIs Protocol Passed - 6/3/2023 11:31 AM        Passed - Recent (12 mo) or future (30 days) visit within the authorizing provider's specialty     Patient has had an office visit with the authorizing provider or a provider within the authorizing providers department within the previous 12 mos or has a future within next 30 days. See \"Patient Info\" tab in inbasket, or \"Choose Columns\" in Meds & Orders section of the refill encounter.              Passed - Medication is Bupropion     If the medication is Bupropion (Wellbutrin), and the patient is taking for smoking cessation; OK to refill.          Passed - Medication is active on med list        Passed - Patient is age 18 or older        Passed - No active pregnancy on record        Passed - No positive pregnancy test in last 12 months             "

## 2023-07-22 ENCOUNTER — HEALTH MAINTENANCE LETTER (OUTPATIENT)
Age: 33
End: 2023-07-22

## 2023-08-30 ENCOUNTER — TRANSFERRED RECORDS (OUTPATIENT)
Dept: ENDOCRINOLOGY | Facility: CLINIC | Age: 33
End: 2023-08-30
Payer: COMMERCIAL

## 2023-08-30 LAB
CHOLESTEROL (EXTERNAL): 176 MG/DL (ref 100–199)
GLUCOSE (EXTERNAL): 68 MG/DL (ref 70–99)
HDLC SERPL-MCNC: 59 MG/DL
HEP C HIM: NORMAL
HIV 1&2 EXT: NORMAL
LDL CHOLESTEROL CALCULATED (EXTERNAL): 99 MG/DL
NON HDL CHOLESTEROL (EXTERNAL): 117 MG/DL
TRIGLYCERIDES (EXTERNAL): 89 MG/DL
TSH SERPL-ACNC: 6.64 UIU/ML (ref 0.27–4.2)

## 2023-11-28 ENCOUNTER — LAB (OUTPATIENT)
Dept: LAB | Facility: CLINIC | Age: 33
End: 2023-11-28
Payer: COMMERCIAL

## 2023-11-28 DIAGNOSIS — E03.9 HYPOTHYROIDISM, UNSPECIFIED TYPE: ICD-10-CM

## 2023-11-28 LAB
CREAT SERPL-MCNC: 0.86 MG/DL (ref 0.51–0.95)
EGFRCR SERPLBLD CKD-EPI 2021: 90 ML/MIN/1.73M2
T4 FREE SERPL-MCNC: 1.55 NG/DL (ref 0.9–1.7)
TSH SERPL DL<=0.005 MIU/L-ACNC: 0.86 UIU/ML (ref 0.3–4.2)
VIT D+METAB SERPL-MCNC: 26 NG/ML (ref 20–50)

## 2023-11-28 PROCEDURE — 84439 ASSAY OF FREE THYROXINE: CPT

## 2023-11-28 PROCEDURE — 84443 ASSAY THYROID STIM HORMONE: CPT

## 2023-11-28 PROCEDURE — 82306 VITAMIN D 25 HYDROXY: CPT

## 2023-11-28 PROCEDURE — 82565 ASSAY OF CREATININE: CPT

## 2023-11-28 PROCEDURE — 36415 COLL VENOUS BLD VENIPUNCTURE: CPT

## 2024-04-30 ENCOUNTER — TELEPHONE (OUTPATIENT)
Dept: ENDOCRINOLOGY | Facility: CLINIC | Age: 34
End: 2024-04-30
Payer: COMMERCIAL

## 2024-04-30 DIAGNOSIS — E03.9 HYPOTHYROIDISM, UNSPECIFIED TYPE: Primary | ICD-10-CM

## 2024-05-21 ENCOUNTER — LAB (OUTPATIENT)
Dept: LAB | Facility: CLINIC | Age: 34
End: 2024-05-21
Payer: COMMERCIAL

## 2024-05-21 DIAGNOSIS — E03.9 HYPOTHYROIDISM, UNSPECIFIED TYPE: ICD-10-CM

## 2024-05-21 LAB
CREAT SERPL-MCNC: 0.91 MG/DL (ref 0.51–0.95)
EGFRCR SERPLBLD CKD-EPI 2021: 84 ML/MIN/1.73M2
T4 FREE SERPL-MCNC: 1.88 NG/DL (ref 0.9–1.7)
TSH SERPL DL<=0.005 MIU/L-ACNC: 0.14 UIU/ML (ref 0.3–4.2)
VIT D+METAB SERPL-MCNC: 27 NG/ML (ref 20–50)

## 2024-05-21 PROCEDURE — 84439 ASSAY OF FREE THYROXINE: CPT

## 2024-05-21 PROCEDURE — 36415 COLL VENOUS BLD VENIPUNCTURE: CPT

## 2024-05-21 PROCEDURE — 84443 ASSAY THYROID STIM HORMONE: CPT

## 2024-05-21 PROCEDURE — 82306 VITAMIN D 25 HYDROXY: CPT

## 2024-05-21 PROCEDURE — 82565 ASSAY OF CREATININE: CPT

## 2024-05-28 ENCOUNTER — OFFICE VISIT (OUTPATIENT)
Dept: ENDOCRINOLOGY | Facility: CLINIC | Age: 34
End: 2024-05-28
Payer: COMMERCIAL

## 2024-05-28 VITALS
SYSTOLIC BLOOD PRESSURE: 114 MMHG | DIASTOLIC BLOOD PRESSURE: 74 MMHG | BODY MASS INDEX: 20.78 KG/M2 | HEART RATE: 70 BPM | WEIGHT: 133 LBS | OXYGEN SATURATION: 99 %

## 2024-05-28 DIAGNOSIS — E03.9 HYPOTHYROIDISM, UNSPECIFIED TYPE: ICD-10-CM

## 2024-05-28 PROCEDURE — 99214 OFFICE O/P EST MOD 30 MIN: CPT | Performed by: NURSE PRACTITIONER

## 2024-05-28 RX ORDER — LEVOTHYROXINE SODIUM 137 MCG
TABLET ORAL
Qty: 90 TABLET | Refills: 3 | Status: SHIPPED | OUTPATIENT
Start: 2024-05-28

## 2024-05-28 NOTE — LETTER
"    5/28/2024         RE: Hillary Singh  [address] [facility] [address]        Dear Colleague,    Thank you for referring your patient, Hillary Singh, to the [facility]. Please see a copy of my visit note below.    [facility] ENDOCRINOLOGY    Thyroid Note  5/29/2024    Hillary Singh, 1990, [id number]          Reason for visit      1. Hypothyroidism, unspecified type        HPI     Hillary Singh is a very pleasant 33 year old old female who presents for follow up.  SUMMARY:    Hillary is here today in f/u for Hypothyroidism. She reports that she is feeling well, albeit tired.  Pt has had Synthroid over the last year and continues to feel well.  She is unable to take Levothyroxine because she didn't really respond well to it and has done much better with the Synthroid. She is taking 137 mcg daily. Labs showed TSH of 0.14 and fT4 of 1.88.  Her Vit D level continues to be low normal. She reports that she \"keeps forgetting to take it\".     Past Medical History     Patient Active Problem List   Diagnosis     Hypothyroidism       Family History       family history is not on file.    Social History      reports that she has never smoked. She has never used smokeless tobacco.      Review of Systems     Patient denies fatigue, weight changes, heat/cold intolerance, bowel/skin changes or CVS symptoms.   Remainder per HPI and per attached intake form.      Vital Signs     /74 (BP Location: Right arm, Patient Position: Sitting, Cuff Size: Adult Regular)   Pulse 70   Wt 60.3 kg (133 lb)   SpO2 99%   BMI 20.78 kg/m    Wt Readings from Last 3 Encounters:   05/28/24 60.3 kg (133 lb)   03/01/23 57.6 kg (127 lb)   02/23/22 58.5 kg (128 lb 14.4 oz)       Physical Exam     General:  Normal, NIRD,appears euthyroid  Eyes:  Pupils equal, round and reactive to light; no proptosis, lid lag or  periorbital edema.  Thyroid:  Thyroid is normal.  No tenderness or bruit  Neck: No " "lymph nodes  Musculoskeletal:  Muscle strength grossly normal without evidence of wasting.  Heart:  Regular rate and rhythm without murmur.  Lungs:  Clear to auscultation.  Abdomen: Soft, non-tender, no masses or organomegaly  Neuro: Patella Reflexes were normal.No tremors  Skin:  No acanthosis nigricans or vitiligo        Assessment     1. Hypothyroidism, unspecified type            Plan     With discussion, and after last year's attempts to mess around with her dosing, we decided that the better part of valor would be to just leave things alone. She is going to work on remembering to take her Vit D. She will remain on the 137 mcg of Synthroid daily.  Follow-up with me in 1 year.         Korina Heaton NP  [facility]  5/29/2024  6:08 AM      Lab Results     TSH   Date Value Ref Range Status   05/21/2024 0.14 (L) 0.30 - 4.20 uIU/mL Final   02/16/2022 2.90 0.30 - 5.00 uIU/mL Final     No components found for: \"THYROIDAB\"    No results found for: \"P6IVFCW\"    Imaging Results   Last thyroid ultrasound:  No results found for this or any previous visit.      Last thyroid nuclear scan:  No results found for this or any previous visit.      Current Medications     Outpatient Medications Prior to Visit   Medication Sig Dispense Refill     buPROPion (WELLBUTRIN XL) 150 MG 24 hr tablet TAKE 1 TABLET BY MOUTH EVERY DAY 90 tablet 0     norethindrone (MICRONOR) 0.35 mg tablet [NORETHINDRONE (MICRONOR) 0.35 MG TABLET] Take 1 tablet by mouth daily.       SYNTHROID 137 MCG tablet [SYNTHROID 137 MCG TABLET] TAKE ONE TABLET BY MOUTH EVERY DAY AT 6 AM 90 tablet 3     No facility-administered medications prior to visit.       Again, thank you for allowing me to participate in the care of your patient.        Sincerely,        Korina Heaton NP  "

## 2024-05-29 NOTE — PROGRESS NOTES
"[facility] ENDOCRINOLOGY    Thyroid Note  5/29/2024    Hillary Singh, 1990, [id number]          Reason for visit      1. Hypothyroidism, unspecified type        HPI     Hillary Singh is a very pleasant 33 year old old female who presents for follow up.  SUMMARY:    Hillary is here today in f/u for Hypothyroidism. She reports that she is feeling well, albeit tired.  Pt has had Synthroid over the last year and continues to feel well.  She is unable to take Levothyroxine because she didn't really respond well to it and has done much better with the Synthroid. She is taking 137 mcg daily. Labs showed TSH of 0.14 and fT4 of 1.88.  Her Vit D level continues to be low normal. She reports that she \"keeps forgetting to take it\".     Past Medical History     Patient Active Problem List   Diagnosis    Hypothyroidism       Family History       family history is not on file.    Social History      reports that she has never smoked. She has never used smokeless tobacco.      Review of Systems     Patient denies fatigue, weight changes, heat/cold intolerance, bowel/skin changes or CVS symptoms.   Remainder per HPI and per attached intake form.      Vital Signs     /74 (BP Location: Right arm, Patient Position: Sitting, Cuff Size: Adult Regular)   Pulse 70   Wt 60.3 kg (133 lb)   SpO2 99%   BMI 20.78 kg/m    Wt Readings from Last 3 Encounters:   05/28/24 60.3 kg (133 lb)   03/01/23 57.6 kg (127 lb)   02/23/22 58.5 kg (128 lb 14.4 oz)       Physical Exam     General:  Normal, NIRD,appears euthyroid  Eyes:  Pupils equal, round and reactive to light; no proptosis, lid lag or  periorbital edema.  Thyroid:  Thyroid is normal.  No tenderness or bruit  Neck: No lymph nodes  Musculoskeletal:  Muscle strength grossly normal without evidence of wasting.  Heart:  Regular rate and rhythm without murmur.  Lungs:  Clear to auscultation.  Abdomen: Soft, non-tender, no masses or organomegaly  Neuro: Patella Reflexes " "were normal.No tremors  Skin:  No acanthosis nigricans or vitiligo        Assessment     1. Hypothyroidism, unspecified type            Plan     With discussion, and after last year's attempts to mess around with her dosing, we decided that the better part of isaias would be to just leave things alone. She is going to work on remembering to take her Vit D. She will remain on the 137 mcg of Synthroid daily.  Follow-up with me in 1 year.         Korina Heaton NP   Endocrinology  5/29/2024  6:08 AM      Lab Results     TSH   Date Value Ref Range Status   05/21/2024 0.14 (L) 0.30 - 4.20 uIU/mL Final   02/16/2022 2.90 0.30 - 5.00 uIU/mL Final     No components found for: \"THYROIDAB\"    No results found for: \"I3GSHZC\"    Imaging Results   Last thyroid ultrasound:  No results found for this or any previous visit.      Last thyroid nuclear scan:  No results found for this or any previous visit.      Current Medications     Outpatient Medications Prior to Visit   Medication Sig Dispense Refill    buPROPion (WELLBUTRIN XL) 150 MG 24 hr tablet TAKE 1 TABLET BY MOUTH EVERY DAY 90 tablet 0    norethindrone (MICRONOR) 0.35 mg tablet [NORETHINDRONE (MICRONOR) 0.35 MG TABLET] Take 1 tablet by mouth daily.      SYNTHROID 137 MCG tablet [SYNTHROID 137 MCG TABLET] TAKE ONE TABLET BY MOUTH EVERY DAY AT 6 AM 90 tablet 3     No facility-administered medications prior to visit.         "

## 2024-11-17 ENCOUNTER — HEALTH MAINTENANCE LETTER (OUTPATIENT)
Age: 34
End: 2024-11-17

## 2025-01-28 ENCOUNTER — TELEPHONE (OUTPATIENT)
Dept: ENDOCRINOLOGY | Facility: CLINIC | Age: 35
End: 2025-01-28
Payer: COMMERCIAL

## 2025-01-28 ENCOUNTER — MYC MEDICAL ADVICE (OUTPATIENT)
Dept: ENDOCRINOLOGY | Facility: CLINIC | Age: 35
End: 2025-01-28
Payer: COMMERCIAL

## 2025-01-28 NOTE — TELEPHONE ENCOUNTER
[facility] Call Center    Phone Message    May a detailed message be left on voicemail: yes     Reason for Call: Medication Question or concern regarding medication   Prescription Clarification  Name of Medication: SYNTHROID 137 MCG tablet  Prescribing Provider: Korina Heaton   Pharmacy: [facility] - [place], [place] - [address] [place] [address]    What on the order needs clarification?    Per pharmacy would like a call back about changing the RX to a diff brand so pt doesn't have to pay out of pocket of $135, if the brand is changed pt would only pay $10. Please call pharmacy back to discuss. Thank you.     PH#[phone]   REF#[id number]         Action Taken: Message routed to:  [facility] ENDO    Travel Screening: Not Applicable     Date of Service:

## 2025-01-28 NOTE — TELEPHONE ENCOUNTER
Called pharmacy back to inform that pt needs brand Synthroid. Was told that they fill their generic levothyroxine as brand Synthroid at the price of generic. The bottle would read levothyroxine but was told it would be brand Synthroid. I told them we would check with pt. Response needs to be resent by the end of day or they will fill as LEEROY at $135.     LM for pt and MC sent.

## 2025-01-28 NOTE — TELEPHONE ENCOUNTER
Spoke with pt. She would like to keep it as brand Synthroid at the $135 price.     [facility] notified.

## 2025-05-19 ENCOUNTER — RESULTS FOLLOW-UP (OUTPATIENT)
Dept: ENDOCRINOLOGY | Facility: CLINIC | Age: 35
End: 2025-05-19

## 2025-05-28 ENCOUNTER — OFFICE VISIT (OUTPATIENT)
Dept: ENDOCRINOLOGY | Facility: CLINIC | Age: 35
End: 2025-05-28
Payer: COMMERCIAL

## 2025-05-28 VITALS
BODY MASS INDEX: 21.15 KG/M2 | DIASTOLIC BLOOD PRESSURE: 65 MMHG | OXYGEN SATURATION: 100 % | HEART RATE: 66 BPM | SYSTOLIC BLOOD PRESSURE: 105 MMHG | WEIGHT: 135.4 LBS

## 2025-05-28 DIAGNOSIS — E03.9 HYPOTHYROIDISM, UNSPECIFIED TYPE: ICD-10-CM

## 2025-05-28 PROBLEM — M53.3 SI (SACROILIAC) PAIN: Status: ACTIVE | Noted: 2024-08-13

## 2025-05-28 PROBLEM — F32.1 MAJOR DEPRESSIVE DISORDER, SINGLE EPISODE, MODERATE (H): Status: ACTIVE | Noted: 2025-05-28

## 2025-05-28 PROCEDURE — 99203 OFFICE O/P NEW LOW 30 MIN: CPT | Performed by: NURSE PRACTITIONER

## 2025-05-28 PROCEDURE — 3074F SYST BP LT 130 MM HG: CPT | Performed by: NURSE PRACTITIONER

## 2025-05-28 PROCEDURE — 3078F DIAST BP <80 MM HG: CPT | Performed by: NURSE PRACTITIONER

## 2025-05-28 RX ORDER — LEVOTHYROXINE SODIUM 137 MCG
TABLET ORAL
Qty: 90 TABLET | Refills: 3 | Status: SHIPPED | OUTPATIENT
Start: 2025-05-28

## 2025-05-28 NOTE — PROGRESS NOTES
[facility] ENDOCRINOLOGY    Thyroid Note  5/28/2025    Hillary Singh, 1990, [id number]          Reason for visit      1. Hypothyroidism, unspecified type        HPI     Hillary Singh is a very pleasant 34 year old old female who presents for follow up.  SUMMARY:    Hillary is seen today in follow-up for Hypothyroidism. She reports today that she is feeling well. She feels that supporting her Vit D level with some supplementation has been helpful for her over the Winter. She also reports that her back PT has been useful as well. She continues to take Synthroid, 137 mcg daily. Current TSH is 3.40 and fT4 is 1.25. Vit D level is 33. Normal Renal function. She is having no problems referable to her neck.     Past Medical History     Patient Active Problem List   Diagnosis    Hypothyroidism    Major depressive disorder, single episode, moderate (H)    SI (sacroiliac) pain       Family History       family history is not on file.    Social History      reports that she has never smoked. She has never used smokeless tobacco.      Review of Systems     Patient denies fatigue, weight changes, heat/cold intolerance, bowel/skin changes or CVS symptoms.   Remainder per HPI and per attached intake form.      Vital Signs     /65 (BP Location: Right arm, Patient Position: Sitting, Cuff Size: Adult Regular)   Pulse 66   Wt 61.4 kg (135 lb 6.4 oz)   SpO2 100%   BMI 21.15 kg/m    Wt Readings from Last 3 Encounters:   05/28/25 61.4 kg (135 lb 6.4 oz)   05/28/24 60.3 kg (133 lb)   03/01/23 57.6 kg (127 lb)       Physical Exam     General:  Normal, NIRD,appears euthyroid  Eyes:  Pupils equal, round and reactive to light; no proptosis, lid lag or  periorbital edema.  Thyroid:  Thyroid is normal.  No tenderness or bruit  Neck: No lymph nodes  Musculoskeletal:  Muscle strength grossly normal without evidence of wasting.  Heart:  Regular rate and rhythm without murmur.  Lungs:  Clear to auscultation.  Abdomen:  "Soft, non-tender, no masses or organomegaly  Neuro: Patella Reflexes were normal.No tremors  Skin:  No acanthosis nigricans or vitiligo      Assessment     1. Hypothyroidism, unspecified type            Plan     She is bio-chemically and physically euthyroid. She will remain on her current dose of Synthroid and follow-up with me in 1 year.         Korina Heaton NP  [facility]  5/28/2025  7:29 AM          This note has been dictated using voice recognition software.  Any grammatical or context distortions are unintentional and inherent to the software.     Lab Results     TSH   Date Value Ref Range Status   05/16/2025 3.40 0.30 - 4.20 uIU/mL Final   02/16/2022 2.90 0.30 - 5.00 uIU/mL Final     No components found for: \"THYROIDAB\"    No results found for: \"D6FUXRJ\"    Imaging Results   Last thyroid ultrasound:  No results found for this or any previous visit.      Last thyroid nuclear scan:  No results found for this or any previous visit.      Current Medications     Outpatient Medications Prior to Visit   Medication Sig Dispense Refill    norethindrone (MICRONOR) 0.35 mg tablet [NORETHINDRONE (MICRONOR) 0.35 MG TABLET] Take 1 tablet by mouth daily.      buPROPion (WELLBUTRIN XL) 150 MG 24 hr tablet TAKE 1 TABLET BY MOUTH EVERY DAY (Patient not taking: Reported on 5/28/2025) 90 tablet 0    SYNTHROID 137 MCG tablet [SYNTHROID 137 MCG TABLET] TAKE ONE TABLET BY MOUTH EVERY DAY AT 6 AM 90 tablet 3     No facility-administered medications prior to visit.         "

## 2025-05-28 NOTE — LETTER
5/28/2025      Hillary Singh  [address] [facility] [address]      Dear Colleague,    Thank you for referring your patient, Hillary Singh, to the [facility]. Please see a copy of my visit note below.    [facility] ENDOCRINOLOGY    Thyroid Note  5/28/2025    Hillary Singh, 1990, [id number]          Reason for visit      1. Hypothyroidism, unspecified type        HPI     Hillary Singh is a very pleasant 34 year old old female who presents for follow up.  SUMMARY:    Hillary is seen today in follow-up for Hypothyroidism. She reports today that she is feeling well. She feels that supporting her Vit D level with some supplementation has been helpful for her over the Winter. She also reports that her back PT has been useful as well. She continues to take Synthroid, 137 mcg daily. Current TSH is 3.40 and fT4 is 1.25. Vit D level is 33. Normal Renal function. She is having no problems referable to her neck.     Past Medical History     Patient Active Problem List   Diagnosis     Hypothyroidism     Major depressive disorder, single episode, moderate (H)     SI (sacroiliac) pain       Family History       family history is not on file.    Social History      reports that she has never smoked. She has never used smokeless tobacco.      Review of Systems     Patient denies fatigue, weight changes, heat/cold intolerance, bowel/skin changes or CVS symptoms.   Remainder per HPI and per attached intake form.      Vital Signs     /65 (BP Location: Right arm, Patient Position: Sitting, Cuff Size: Adult Regular)   Pulse 66   Wt 61.4 kg (135 lb 6.4 oz)   SpO2 100%   BMI 21.15 kg/m    Wt Readings from Last 3 Encounters:   05/28/25 61.4 kg (135 lb 6.4 oz)   05/28/24 60.3 kg (133 lb)   03/01/23 57.6 kg (127 lb)       Physical Exam     General:  Normal, NIRD,appears euthyroid  Eyes:  Pupils equal, round and reactive to light; no proptosis, lid lag or  periorbital  "edema.  Thyroid:  Thyroid is normal.  No tenderness or bruit  Neck: No lymph nodes  Musculoskeletal:  Muscle strength grossly normal without evidence of wasting.  Heart:  Regular rate and rhythm without murmur.  Lungs:  Clear to auscultation.  Abdomen: Soft, non-tender, no masses or organomegaly  Neuro: Patella Reflexes were normal.No tremors  Skin:  No acanthosis nigricans or vitiligo      Assessment     1. Hypothyroidism, unspecified type            Plan     She is bio-chemically and physically euthyroid. She will remain on her current dose of Synthroid and follow-up with me in 1 year.         Korina Heaton NP  [facility]  5/28/2025  7:29 AM          This note has been dictated using voice recognition software.  Any grammatical or context distortions are unintentional and inherent to the software.     Lab Results     TSH   Date Value Ref Range Status   05/16/2025 3.40 0.30 - 4.20 uIU/mL Final   02/16/2022 2.90 0.30 - 5.00 uIU/mL Final     No components found for: \"THYROIDAB\"    No results found for: \"H0FYJCR\"    Imaging Results   Last thyroid ultrasound:  No results found for this or any previous visit.      Last thyroid nuclear scan:  No results found for this or any previous visit.      Current Medications     Outpatient Medications Prior to Visit   Medication Sig Dispense Refill     norethindrone (MICRONOR) 0.35 mg tablet [NORETHINDRONE (MICRONOR) 0.35 MG TABLET] Take 1 tablet by mouth daily.       buPROPion (WELLBUTRIN XL) 150 MG 24 hr tablet TAKE 1 TABLET BY MOUTH EVERY DAY (Patient not taking: Reported on 5/28/2025) 90 tablet 0     SYNTHROID 137 MCG tablet [SYNTHROID 137 MCG TABLET] TAKE ONE TABLET BY MOUTH EVERY DAY AT 6 AM 90 tablet 3     No facility-administered medications prior to visit.         Again, thank you for allowing me to participate in the care of your patient.        Sincerely,        Korina Heaton NP    Electronically signed"

## 2025-07-03 ENCOUNTER — TELEPHONE (OUTPATIENT)
Dept: ENDOCRINOLOGY | Facility: CLINIC | Age: 35
End: 2025-07-03
Payer: COMMERCIAL

## 2025-07-03 NOTE — TELEPHONE ENCOUNTER
[facility] Call Center    Phone Message    May a detailed message be left on voicemail: yes     Reason for Call: Other: Diamond with [facility] is reaching out to notify us at this time that they are not able to fill the Synthroid as only the Levothyroxine brand would be covered under her insurance plan. Diamond is requesting for this brand to be sent over to them so that they can fill her medication. Please advise.      Action Taken: Other: Endo    Travel Screening: Not Applicable     Date of Service: 7/03/25